# Patient Record
Sex: FEMALE | Race: WHITE | Employment: UNEMPLOYED | URBAN - METROPOLITAN AREA
[De-identification: names, ages, dates, MRNs, and addresses within clinical notes are randomized per-mention and may not be internally consistent; named-entity substitution may affect disease eponyms.]

---

## 2017-06-06 ENCOUNTER — HOSPITAL ENCOUNTER (EMERGENCY)
Age: 32
Discharge: HOME | End: 2017-06-06
Attending: OBSTETRICS & GYNECOLOGY | Admitting: OBSTETRICS & GYNECOLOGY
Payer: MEDICAID

## 2017-06-06 VITALS
DIASTOLIC BLOOD PRESSURE: 78 MMHG | TEMPERATURE: 98.3 F | WEIGHT: 207 LBS | SYSTOLIC BLOOD PRESSURE: 134 MMHG | HEART RATE: 101 BPM | BODY MASS INDEX: 29.63 KG/M2 | HEIGHT: 70 IN | RESPIRATION RATE: 16 BRPM

## 2017-06-06 LAB
BASOPHILS # BLD AUTO: 0 K/UL (ref 0–0.06)
BASOPHILS # BLD: 0 % (ref 0–2)
DIFFERENTIAL METHOD BLD: ABNORMAL
EOSINOPHIL # BLD: 0.1 K/UL (ref 0–0.4)
EOSINOPHIL NFR BLD: 1 % (ref 0–5)
ERYTHROCYTE [DISTWIDTH] IN BLOOD BY AUTOMATED COUNT: 13.2 % (ref 11.6–14.5)
HCT VFR BLD AUTO: 37.4 % (ref 35–45)
HGB BLD-MCNC: 12.8 G/DL (ref 12–16)
LYMPHOCYTES # BLD AUTO: 13 % (ref 21–52)
LYMPHOCYTES # BLD: 1.9 K/UL (ref 0.9–3.6)
MCH RBC QN AUTO: 28.3 PG (ref 24–34)
MCHC RBC AUTO-ENTMCNC: 34.2 G/DL (ref 31–37)
MCV RBC AUTO: 82.7 FL (ref 74–97)
MONOCYTES # BLD: 1.1 K/UL (ref 0.05–1.2)
MONOCYTES NFR BLD AUTO: 8 % (ref 3–10)
NEUTS SEG # BLD: 12 K/UL (ref 1.8–8)
NEUTS SEG NFR BLD AUTO: 78 % (ref 40–73)
PLATELET # BLD AUTO: 285 K/UL (ref 135–420)
PMV BLD AUTO: 10.4 FL (ref 9.2–11.8)
RBC # BLD AUTO: 4.52 M/UL (ref 4.2–5.3)
WBC # BLD AUTO: 15.1 K/UL (ref 4.6–13.2)

## 2017-06-06 PROCEDURE — 76815 OB US LIMITED FETUS(S): CPT

## 2017-06-06 PROCEDURE — 99283 EMERGENCY DEPT VISIT LOW MDM: CPT

## 2017-06-06 PROCEDURE — 85025 COMPLETE CBC W/AUTO DIFF WBC: CPT | Performed by: OBSTETRICS & GYNECOLOGY

## 2017-06-06 PROCEDURE — 87086 URINE CULTURE/COLONY COUNT: CPT | Performed by: OBSTETRICS & GYNECOLOGY

## 2017-06-06 PROCEDURE — 74011250637 HC RX REV CODE- 250/637: Performed by: OBSTETRICS & GYNECOLOGY

## 2017-06-06 PROCEDURE — 36415 COLL VENOUS BLD VENIPUNCTURE: CPT | Performed by: OBSTETRICS & GYNECOLOGY

## 2017-06-06 RX ORDER — ALBUTEROL SULFATE 90 UG/1
2 AEROSOL, METERED RESPIRATORY (INHALATION)
COMMUNITY

## 2017-06-06 RX ORDER — FLUTICASONE PROPIONATE AND SALMETEROL 100; 50 UG/1; UG/1
1 POWDER RESPIRATORY (INHALATION) EVERY 12 HOURS
COMMUNITY

## 2017-06-06 RX ORDER — ACETAMINOPHEN 325 MG/1
650 TABLET ORAL ONCE
Status: COMPLETED | OUTPATIENT
Start: 2017-06-06 | End: 2017-06-06

## 2017-06-06 RX ORDER — MELATONIN
1000 DAILY
COMMUNITY

## 2017-06-06 RX ADMIN — ACETAMINOPHEN 650 MG: 325 TABLET ORAL at 18:52

## 2017-06-06 NOTE — IP AVS SNAPSHOT
Jordyn Mercedes 
 
 
 [address] [facility] [address] 
[phone] Patient: Janie Louise MRN: [id number] :1985 You are allergic to the following No active allergies Recent Documentation Height Weight BMI OB Status Smoking Status 1.778 m 93.9 kg 29.7 kg/m2 Pregnant Current Every Day Smoker Unresulted Labs Order Current Status CULTURE, URINE In process Emergency Contacts Name Discharge Info Relation Home Work Mobile Reynoso Lotus CAREGIVER [3] Spouse [3]   [phone] About your hospitalization You were admitted on:  N/A You last received care in the:  [facility] [address] You were discharged on:  2017 Unit phone number:  [phone] Why you were hospitalized Your primary diagnosis was:  Not on File Providers Seen During Your Hospitalizations Provider Role Specialty Primary office phone Carlos Hummel MD Attending Provider Obstetrics & Gynecology [phone] Your Primary Care Physician (PCP) Primary Care Physician Office Phone Office Fax NOT ON FILE ** None ** ** None ** Follow-up Information Follow up With Details Comments Contact Info Not On File Bshsi   Not On File (62) Patient has a PCP but that physician is not listed in [facility]. Current Discharge Medication List  
  
ASK your doctor about these medications Dose & Instructions Dispensing Information Comments Morning Noon Evening Bedtime ADVAIR DISKUS 100-50 mcg/dose diskus inhaler Generic drug:  fluticasone-salmeterol Your last dose was: Your next dose is:    
   
   
 Dose:  1 Puff Take 1 Puff by inhalation every twelve (12) hours. Refills:  0  
     
   
   
   
  
 albuterol 90 mcg/actuation inhaler Commonly known as:  PROVENTIL HFA, VENTOLIN HFA, PROAIR HFA Your last dose was: Your next dose is:    
   
   
 Dose:  2 Puff Take 2 Puffs by inhalation every four (4) hours as needed for Wheezing. Refills:  0 PNV-DHA PO Your last dose was: Your next dose is: Take  by mouth. Refills:  0  
     
   
   
   
  
 VITAMIN D3 1,000 unit tablet Generic drug:  cholecalciferol Your last dose was: Your next dose is:    
   
   
 Dose:  1000 Units Take 1,000 Units by mouth daily. Refills:  0 Discharge Instructions The office will contact you regarding follow-up appointments with [facility]/MFM. Please monitor for signs of infection- chills, aches, increased temperature (fever > 100.4F), increased abnormal discharge. Please call provider with any further questions. Discharge Orders None Introducing [facility] SERVICES! Tanis Epley introduces Imagiin. patient portal. Now you can access parts of your medical record, email your doctor's office, and request medication refills online. 1. In your internet browser, go to [email or web address] [email or web address] 2. Click on the First Time User? Click Here link in the Sign In box. You will see the New Member Sign Up page. 3. Enter your Imagiin. Access Code exactly as it appears below. You will not need to use this code after youve completed the sign-up process. If you do not sign up before the expiration date, you must request a new code. · Imagiin. Access Code: [email or web address] Expires: 9/4/2017  3:18 PM 
 
4. Enter the last four digits of your Social Security Number (xxxx) and Date of Birth (mm/dd/yyyy) as indicated and click Submit. You will be taken to the next sign-up page. 5. Create a C[employer]ras [employer] ID. This will be your Imagiin. login ID and cannot be changed, so think of one that is secure and easy to remember. 6. Create a [employer] password. You can change your password at any time. 7. Enter your Password Reset Question and Answer. This can be used at a later time if you forget your password. 8. Enter your e-mail address. You will receive e-mail notification when new information is available in [address] 9. Click Sign Up. You can now view and download portions of your medical record. 10. Click the Download Summary menu link to download a portable copy of your medical information. If you have questions, please visit the Frequently Asked Questions section of the [employer]Battle website. Remember, YaBattle is NOT to be used for urgent needs. For medical emergencies, dial 911. Now available from your iPhone and Android! General Information Please provide this summary of care documentation to your next provider. Patient Signature:  ____________________________________________________________ Date:  ____________________________________________________________  
  
Daniella Gardner Provider Signature:  ____________________________________________________________ Date:  ____________________________________________________________

## 2017-06-06 NOTE — IP AVS SNAPSHOT
Summary of Care Report The Summary of Care report has been created to help improve care coordination. Users with access to [employer] or [address] (Web-based application) may access additional patient information including the Discharge Summary. If you are not currently a [address] user and need more information, please call the number listed below in the [place] [address] section and ask to be connected with Medical Records. Facility Information Name Address Phone [address] [address] [phone] Patient Information Patient Name Sex  Autumn Donato ([id number]) Female 1985 Discharge Information Admitting Provider Service Area Unit Chung Cordova MD / [address] [place] [address] L&D / [phone] Discharge Provider Discharge Date/Time Discharge Disposition Destination (none) 2017 (Pending) AHR (none) Patient Language Language ENGLISH [13] You are allergic to the following No active allergies Current Discharge Medication List  
  
ASK your doctor about these medications Dose & Instructions Dispensing Information Comments ADVAIR DISKUS 100-50 mcg/dose diskus inhaler Generic drug:  fluticasone-salmeterol Dose:  1 Puff Take 1 Puff by inhalation every twelve (12) hours. Refills:  0  
   
 albuterol 90 mcg/actuation inhaler Commonly known as:  PROVENTIL HFA, VENTOLIN HFA, PROAIR HFA Dose:  2 Puff Take 2 Puffs by inhalation every four (4) hours as needed for Wheezing. Refills:  0 PNV-DHA PO Take  by mouth. Refills:  0  
   
 VITAMIN D3 1,000 unit tablet Generic drug:  cholecalciferol Dose:  1000 Units Take 1,000 Units by mouth daily. Refills:  0 Follow-up Information Follow up With Details Comments Contact Info Not On File Bsi   Not On File (62) Patient has a PCP but that physician is not listed in [address] [facility]. Discharge Instructions The office will contact you regarding follow-up appointments with [facility]/MFM. Please monitor for signs of infection- chills, aches, increased temperature (fever > 100.4F), increased abnormal discharge. Please call provider with any further questions. Chart Review Routing History No Routing History on File

## 2017-06-06 NOTE — H&P
History & Physical    Name: Araceli Lacy MRN: [id number]  SSN: [id number]    YOB: 1985  Age: 36 y.o. Sex: female      Subjective:     Reason for Admission:  Pregnancy and PPROM    History of Present Illness: Ms. Ludmila Mason is a 36 y.o.  female with an estimated gestational age of 16w7d with Estimated Date of Delivery: 11/15/17. Patient complains of rupture of membranes for 1 days. Pregnancy has been complicated by  premature rupture of membranes. Patient denies abdominal pain  , fever and pelvic pressure. OB History    Para Term  AB SAB TAB Ectopic Multiple Living   2    1 1          # Outcome Date GA Lbr [place]/2nd Weight Sex Delivery Anes PTL Lv   2 Current            1 SAB                 Past Medical History:   Diagnosis Date    Depression      History reviewed. No pertinent surgical history. Social History     Occupational History    Not on file. Social History Main Topics    Smoking status: Current Every Day Smoker    Smokeless tobacco: Not on file    Alcohol use No    Drug use: No    Sexual activity: Not on file      History reviewed. No pertinent family history. No Known Allergies  Prior to Admission medications    Medication Sig Start Date End Date Taking? Authorizing Provider   fluticasone-salmeterol (ADVAIR DISKUS) 100-50 mcg/dose diskus inhaler Take 1 Puff by inhalation every twelve (12) hours. Yes Historical Provider   PNV COMBO#47/IRON/FA #1/DHA (PNV-DHA PO) Take  by mouth. Yes Historical Provider   cholecalciferol (VITAMIN D3) 1,000 unit tablet Take 1,000 Units by mouth daily. Yes Historical Provider   albuterol (PROVENTIL HFA, VENTOLIN HFA, PROAIR HFA) 90 mcg/actuation inhaler Take 2 Puffs by inhalation every four (4) hours as needed for Wheezing. Historical Provider        Review of Systems:  A comprehensive review of systems was negative except for that written in the History of Present Illness.      Objective:     Vitals: Vitals:    17 1518 17 1525   Pulse:  78   Resp:  16   Temp:  98.3 °F (36.8 °C)   Weight: 93.9 kg (207 lb)    Height: 5' 10\" (1.778 m)       Temp (24hrs), Av.3 °F (36.8 °C), Min:98.3 °F (36.8 °C), Max:98.3 °F (36.8 °C)    No Data Recorded     Physical Exam:  Patient without distress. Back: costovertebral angle tenderness absent  Abdomen: soft, nontender  Fundus: soft and non tender  Perineum: blood present, amniotic fluid present  Cervical Exam: 1 (per Dr. Lezama.)/ / /   Lower Extremities:  - No evidence of DVT seen on physical exam.     Membranes:  Previable PPROM  Uterine Activity:  None  Fetal Heart Rate:  present     Sterile speculum exam - no fetal parts, membranes present at 1 cm dilated os  US - absent fluid, breech presentation, Fetal heart activity noted    Lab/Data Review:  No results found for this or any previous visit (from the past 24 hour(s)). Assessment and Plan: Active Problems:    * No active hospital problems. *     - Previable  Premature Rupture of the Membranes:     Discussed options for management. Reviewed risks of infection, sepsis, fetal death, skeletal abnormalities, and fair intact survival rate. Patient and  want to proceed with conservative management. Explained the signs and symptoms of infection. Will discuss with RAI QUINTANILLA.    Signed By:  Chuckie Rodríguez MD     2017

## 2017-06-06 NOTE — PROGRESS NOTES
1650 Abdominal ultrasound by Dr. Elsa Goldberg. No fluid noted upon ultrasound.     1657 SSE by Dr. Elsa Goldberg

## 2017-06-06 NOTE — PROGRESS NOTES
Reviewed plan of care with Dr. Ze Tristan at [facility]. Will discharge home with referral to [facility]. Discussed with patient plan of care. Reviewed risk of infection with patient and family. Discussed possible delivery signs and symptoms. Reviewed risk of retained placenta and the need to return to the hospital if infection starts or labor starts.

## 2017-06-06 NOTE — DISCHARGE INSTRUCTIONS
The office will contact you regarding follow-up appointments with [facility]/MFM. Please monitor for signs of infection- chills, aches, increased temperature (fever > 100.4F), increased abnormal discharge. Please call provider with any further questions.

## 2017-06-06 NOTE — PROGRESS NOTES
1515 Patient arrives to unit after being seen in office today by Yumiko Torres. Patient had back pain starting last night with increased yellowish discharge today. Nitrazine positive in the office with potential trailing membranes. [address] at bedside. Patient informed that Dr. Belenda Bloch will come in to evaluate her this evening. Plan is to US & perform speculum exam. Patient would like to wait until evaluation before knowing further details about potential plans of care. 1540 patient resting with family at bedside for support, questions answered. Call bell within reach, denies further needs at this time. 1650 confirmed heart tones via US performed by Dr. Belenda Bloch. 4376 Dr. Belenda Bloch states discharge precautions have been reviewed. The office will contact her for follow up with [facility]. 6038 Discharge orders discussed with patient and family. Patient verbalizes understanding to call provider with any concerns, signs of infection, vaginal pressure, abnormal discharge, vaginal bleeding. Patient denies questions at this time. 6688 patient is taken off unit by family via wheelchair.

## 2017-06-08 ENCOUNTER — HOSPITAL ENCOUNTER (EMERGENCY)
Age: 32
Discharge: HOME | End: 2017-06-08
Attending: OBSTETRICS & GYNECOLOGY | Admitting: OBSTETRICS & GYNECOLOGY
Payer: MEDICAID

## 2017-06-08 VITALS
SYSTOLIC BLOOD PRESSURE: 124 MMHG | BODY MASS INDEX: 29.63 KG/M2 | WEIGHT: 207 LBS | HEIGHT: 70 IN | HEART RATE: 84 BPM | TEMPERATURE: 98.1 F | DIASTOLIC BLOOD PRESSURE: 58 MMHG

## 2017-06-08 PROBLEM — O42.919 PRETERM PREMATURE RUPTURE OF MEMBRANES (PPROM) WITH UNKNOWN ONSET OF LABOR: Status: ACTIVE | Noted: 2017-06-06

## 2017-06-08 LAB
APPEARANCE UR: ABNORMAL
BACTERIA SPEC CULT: NORMAL
BILIRUB UR QL: NEGATIVE
COLOR UR: YELLOW
GLUCOSE UR QL STRIP.AUTO: NEGATIVE MG/DL
KETONES UR-MCNC: NEGATIVE MG/DL
LEUKOCYTE ESTERASE UR QL STRIP: ABNORMAL
NITRITE UR QL: NEGATIVE
PH UR: 7 [PH] (ref 5–9)
PROT UR QL: ABNORMAL MG/DL
RBC # UR STRIP: ABNORMAL /UL
SERVICE CMNT-IMP: ABNORMAL
SERVICE CMNT-IMP: NORMAL
SP GR UR: 1.01 (ref 1–1.02)
UROBILINOGEN UR QL: 1 EU/DL (ref 0.2–1)

## 2017-06-08 PROCEDURE — 99284 EMERGENCY DEPT VISIT MOD MDM: CPT

## 2017-06-08 PROCEDURE — 76815 OB US LIMITED FETUS(S): CPT

## 2017-06-08 PROCEDURE — 81003 URINALYSIS AUTO W/O SCOPE: CPT

## 2017-06-08 NOTE — IP AVS SNAPSHOT
Summary of Care Report The Summary of Care report has been created to help improve care coordination. Users with access to [employer] or [facility] (Web-based application) may access additional patient information including the Discharge Summary. If you are not currently a [address] user and need more information, please call the number listed below in the [place] [address] section and ask to be connected with Medical Records. Facility Information Name Address Phone [address] [address] [phone] Patient Information Patient Name Sex  Rachelle Hall ([id number]) Female 1985 Discharge Information Admitting Provider Service Area Unit Eleni Larios MD / [phone] [facility] [address] Triage / [phone] Discharge Provider Discharge Date/Time Discharge Disposition Destination (none) (none) (none) (none) Patient Language Language ENGLISH [13] You are allergic to the following No active allergies Current Discharge Medication List  
  
ASK your doctor about these medications Dose & Instructions Dispensing Information Comments ADVAIR DISKUS 100-50 mcg/dose diskus inhaler Generic drug:  fluticasone-salmeterol Dose:  1 Puff Take 1 Puff by inhalation every twelve (12) hours. Refills:  0  
   
 albuterol 90 mcg/actuation inhaler Commonly known as:  PROVENTIL HFA, VENTOLIN HFA, PROAIR HFA Dose:  2 Puff Take 2 Puffs by inhalation every four (4) hours as needed for Wheezing. Refills:  0 PNV-DHA PO Take  by mouth. Refills:  0  
   
 VITAMIN D3 1,000 unit tablet Generic drug:  cholecalciferol Dose:  1000 Units Take 1,000 Units by mouth daily. Refills:  0 Follow-up Information None Discharge Instructions None Chart Review Routing History No Routing History on File

## 2017-06-08 NOTE — IP AVS SNAPSHOT
Current Discharge Medication List  
  
ASK your doctor about these medications Dose & Instructions Dispensing Information Comments Morning Noon Evening Bedtime ADVAIR DISKUS 100-50 mcg/dose diskus inhaler Generic drug:  fluticasone-salmeterol Your last dose was: Your next dose is:    
   
   
 Dose:  1 Puff Take 1 Puff by inhalation every twelve (12) hours. Refills:  0  
     
   
   
   
  
 albuterol 90 mcg/actuation inhaler Commonly known as:  PROVENTIL HFA, VENTOLIN HFA, PROAIR HFA Your last dose was: Your next dose is:    
   
   
 Dose:  2 Puff Take 2 Puffs by inhalation every four (4) hours as needed for Wheezing. Refills:  0 PNV-DHA PO Your last dose was: Your next dose is: Take  by mouth. Refills:  0  
     
   
   
   
  
 VITAMIN D3 1,000 unit tablet Generic drug:  cholecalciferol Your last dose was: Your next dose is:    
   
   
 Dose:  1000 Units Take 1,000 Units by mouth daily. Refills:  0

## 2017-06-08 NOTE — IP AVS SNAPSHOT
[address] [place] [id number] 
[phone] Patient: Leno Kerr MRN: [id number] :1985 You are allergic to the following No active allergies Recent Documentation Height Weight Breastfeeding? BMI OB Status Smoking Status 1.778 m 93.9 kg Unknown 29.7 kg/m2 Pregnant Current Every Day Smoker Emergency Contacts Name Discharge Info Relation Home Work Mobile Edgardo Gautam CAREGIVER [3] Spouse [3]   [phone] About your hospitalization You were admitted on:  N/A You last received care in the:  [facility] [address] L&D TRIAGE You were discharged on:  2017 Unit phone number:  [phone] Why you were hospitalized Your primary diagnosis was:  Not on File Providers Seen During Your Hospitalizations Provider Role Specialty Primary office phone Tiffany Monahan MD Attending Provider Obstetrics & Gynecology [phone] Your Primary Care Physician (PCP) Primary Care Physician Office Phone Office Fax OTHER, PHYS ** None ** ** None ** Follow-up Information None Current Discharge Medication List  
  
ASK your doctor about these medications Dose & Instructions Dispensing Information Comments Morning Noon Evening Bedtime ADVAIR DISKUS 100-50 mcg/dose diskus inhaler Generic drug:  fluticasone-salmeterol Your last dose was: Your next dose is:    
   
   
 Dose:  1 Puff Take 1 Puff by inhalation every twelve (12) hours. Refills:  0  
     
   
   
   
  
 albuterol 90 mcg/actuation inhaler Commonly known as:  PROVENTIL HFA, VENTOLIN HFA, PROAIR HFA Your last dose was: Your next dose is:    
   
   
 Dose:  2 Puff Take 2 Puffs by inhalation every four (4) hours as needed for Wheezing. Refills:  0 PNV-DHA PO Your last dose was: Your next dose is: Take  by mouth. Refills:  0  
     
   
   
   
  
 VITAMIN D3 1,000 unit tablet Generic drug:  cholecalciferol Your last dose was: Your next dose is:    
   
   
 Dose:  1000 Units Take 1,000 Units by mouth daily. Refills:  0 Discharge Instructions None Discharge Instructions Attachments/References PREGNANCY: WEEKS 14 TO 18 (ENGLISH) Discharge Orders None Introducing [facility]! Ana Luisa Anayeli introduces '[employer] Your [employer] patient portal. Now you can access parts of your medical record, email your doctor's office, and request medication refills online. 1. In your internet browser, go to [email or web address]. [email or web address] 2. Click on the First Time User? Click Here link in the Sign In box. You will see the New Member Sign Up page. 3. Enter your 'Rock[employer] Your [employer] Access Code exactly as it appears below. You will not need to use this code after youve completed the sign-up process. If you do not sign up before the expiration date, you must request a new code. · '[employer] Your [employer] Access Code: [email or web address] Expires: 9/4/2017  3:18 PM 
 
4. Enter the last four digits of your Social Security Number (xxxx) and Date of Birth (mm/dd/yyyy) as indicated and click Submit. You will be taken to the next sign-up page. 5. Create a 'Rock' Your [employer] ID. This will be your '[employer] Your [employer] login ID and cannot be changed, so think of one that is secure and easy to remember. 6. Create a '[employer] password. You can change your password at any time. 7. Enter your Password Reset Question and Answer. This can be used at a later time if you forget your password. 8. Enter your e-mail address. You will receive e-mail notification when new information is available in [address] 9. Click Sign Up. You can now view and download portions of your medical record. 10. Click the Download Summary menu link to download a portable copy of your medical information. If you have questions, please visit the Frequently Asked Questions section of the MyChart website. Remember, [employer] is NOT to be used for urgent needs. For medical emergencies, dial 911. Now available from your iPhone and Android! General Information Please provide this summary of care documentation to your next provider. Patient Signature:  ____________________________________________________________ Date:  ____________________________________________________________  
  
Azalia Rios Provider Signature:  ____________________________________________________________ Date:  ____________________________________________________________ More Information Weeks 14 to 18 of Your Pregnancy: Care Instructions Your Care Instructions During this time, you may start to \"show,\" so that you look pregnant to people around you. You may also notice some changes in your skin, such as itchy spots on your palms or acne on your face. Your baby is now able to pass urine, and your baby's first stool (meconium) is starting to collect in his or her intestines. Hair is also beginning to grow on your baby's head. At your next visit, between weeks 18 and 20, your doctor may do an ultrasound test. The test allows your doctor to check for certain problems. Your doctor can also tell the sex of your baby. This is a good time to think about whether you want to know whether your baby is a boy or a girl. Talk to your doctor about getting a flu shot to help keep you healthy during your pregnancy. As your pregnancy moves along, it is common to worry or feel anxious. Your body is changing a lot. And you are thinking about giving birth, the health of your baby, and becoming a parent. You can learn to cope with any anxiety and stress you feel. Follow-up care is a key part of your treatment and safety.  Be sure to make and go to all appointments, and call your doctor if you are having problems. It's also a good idea to know your test results and keep a list of the medicines you take. How can you care for yourself at home? Reduce stress · Ask for help with cooking and housekeeping. · Figure out who or what causes your stress. Avoid these people or situations as much as possible. · Relax every day. Taking 10- to 15-minute breaks can make a big difference. Take a walk, listen to music, or take a warm bath. · Learn relaxation techniques at prenatal or yoga class. Or buy a relaxation tape. · List your fears about having a baby and becoming a parent. Share the list with someone you trust. Decide which worries are really small, and try to let them go. Exercise · If you did not exercise much before pregnancy, start slowly. Walking is best. Adelina Glimpse yourself, and do a little more every day. · Brisk walking, easy jogging, low-impact aerobics, water aerobics, and yoga are good choices. Some sports, such as scuba diving, horseback riding, downhill skiing, gymnastics, and water skiing, are not a good idea. · Try to do at least 2½ hours a week of moderate exercise, such as a fast walk. One way to do this is to be active 30 minutes a day, at least 5 days a week. It's fine to be active in blocks of 10 minutes or more throughout your day and week. · Wear loose clothing. And wear shoes and a bra that provide good support. · Warm up and cool down to start and finish your exercise. · If you want to use weights, be sure to use light weights. They reduce stress on your joints. Stay at the best weight for you · Experts recommend that you gain about 1 pound a month during the first 3 months of your pregnancy. · Experts recommend that you gain about 1 pound a week during your last 6 months of pregnancy, for a total weight gain of 25 to 35 pounds. · If you are underweight, you will need to gain more weight (about 28 to 40 pounds). · If you are overweight, you may not need to gain as much weight (about 15 to 25 pounds). · If you are gaining weight too fast, use common sense. Exercise every day, and limit sweets, fast foods, and fats. Choose lean meats, fruits, and vegetables. · If you are having twins or more, your doctor may refer you to a dietitian. Where can you learn more? Go to http://jl-chemo.info/. Enter C159 in the search box to learn more about \"Weeks 14 to 18 of Your Pregnancy: Care Instructions. \" Current as of: May 30, 2016 Content Version: 11.2 © 7379-5693 [employer], [employer]. Care instructions adapted under license by [employer] (which disclaims liability or warranty for this information). If you have questions about a medical condition or this instruction, always ask your healthcare professional. [place] [address] any warranty or liability for your use of this information.

## 2017-06-09 ENCOUNTER — HOSPITAL ENCOUNTER (EMERGENCY)
Age: 32
Discharge: HOME | End: 2017-06-09
Attending: EMERGENCY MEDICINE | Admitting: INTERNAL MEDICINE
Payer: MEDICAID

## 2017-06-09 VITALS
WEIGHT: 210 LBS | HEIGHT: 71 IN | RESPIRATION RATE: 25 BRPM | DIASTOLIC BLOOD PRESSURE: 51 MMHG | SYSTOLIC BLOOD PRESSURE: 111 MMHG | TEMPERATURE: 98.8 F | BODY MASS INDEX: 29.4 KG/M2 | OXYGEN SATURATION: 99 % | HEART RATE: 88 BPM

## 2017-06-09 DIAGNOSIS — O36.4XX0: ICD-10-CM

## 2017-06-09 DIAGNOSIS — O42.112 PRETERM PREMATURE RUPTURE OF MEMBRANES WITH ONSET OF LABOR MORE THAN 24 HOURS FOLLOWING RUPTURE IN SECOND TRIMESTER: Primary | ICD-10-CM

## 2017-06-09 LAB
ALBUMIN SERPL BCP-MCNC: 2.7 G/DL (ref 3.4–5)
ALBUMIN/GLOB SERPL: 0.6 {RATIO} (ref 0.8–1.7)
ALP SERPL-CCNC: 70 U/L (ref 45–117)
ALT SERPL-CCNC: 11 U/L (ref 13–56)
ANION GAP BLD CALC-SCNC: 9 MMOL/L (ref 3–18)
APTT PPP: 27.6 SEC (ref 23–36.4)
AST SERPL W P-5'-P-CCNC: 10 U/L (ref 15–37)
BASOPHILS # BLD AUTO: 0 K/UL (ref 0–0.06)
BASOPHILS # BLD: 0 % (ref 0–2)
BILIRUB SERPL-MCNC: 0.5 MG/DL (ref 0.2–1)
BUN SERPL-MCNC: 5 MG/DL (ref 7–18)
BUN/CREAT SERPL: 8 (ref 12–20)
CALCIUM SERPL-MCNC: 8.5 MG/DL (ref 8.5–10.1)
CHLORIDE SERPL-SCNC: 104 MMOL/L (ref 100–108)
CO2 SERPL-SCNC: 23 MMOL/L (ref 21–32)
CREAT SERPL-MCNC: 0.65 MG/DL (ref 0.6–1.3)
DIFFERENTIAL METHOD BLD: ABNORMAL
EOSINOPHIL # BLD: 0 K/UL (ref 0–0.4)
EOSINOPHIL NFR BLD: 0 % (ref 0–5)
ERYTHROCYTE [DISTWIDTH] IN BLOOD BY AUTOMATED COUNT: 12.7 % (ref 11.6–14.5)
GLOBULIN SER CALC-MCNC: 4.3 G/DL (ref 2–4)
GLUCOSE SERPL-MCNC: 116 MG/DL (ref 74–99)
HCT VFR BLD AUTO: 35.4 % (ref 35–45)
HGB BLD-MCNC: 12 G/DL (ref 12–16)
INR PPP: 1 (ref 0.8–1.2)
LYMPHOCYTES # BLD AUTO: 7 % (ref 21–52)
LYMPHOCYTES # BLD: 1.1 K/UL (ref 0.9–3.6)
MCH RBC QN AUTO: 27.6 PG (ref 24–34)
MCHC RBC AUTO-ENTMCNC: 33.9 G/DL (ref 31–37)
MCV RBC AUTO: 81.6 FL (ref 74–97)
MONOCYTES # BLD: 1.3 K/UL (ref 0.05–1.2)
MONOCYTES NFR BLD AUTO: 8 % (ref 3–10)
NEUTS SEG # BLD: 13.1 K/UL (ref 1.8–8)
NEUTS SEG NFR BLD AUTO: 85 % (ref 40–73)
PLATELET # BLD AUTO: 279 K/UL (ref 135–420)
PMV BLD AUTO: 10.2 FL (ref 9.2–11.8)
POTASSIUM SERPL-SCNC: 3.5 MMOL/L (ref 3.5–5.5)
PROT SERPL-MCNC: 7 G/DL (ref 6.4–8.2)
PROTHROMBIN TIME: 13 SEC (ref 11.5–15.2)
RBC # BLD AUTO: 4.34 M/UL (ref 4.2–5.3)
SODIUM SERPL-SCNC: 136 MMOL/L (ref 136–145)
WBC # BLD AUTO: 15.6 K/UL (ref 4.6–13.2)

## 2017-06-09 PROCEDURE — 74011250636 HC RX REV CODE- 250/636: Performed by: EMERGENCY MEDICINE

## 2017-06-09 PROCEDURE — 96374 THER/PROPH/DIAG INJ IV PUSH: CPT

## 2017-06-09 PROCEDURE — 85025 COMPLETE CBC W/AUTO DIFF WBC: CPT | Performed by: EMERGENCY MEDICINE

## 2017-06-09 PROCEDURE — 74011250637 HC RX REV CODE- 250/637: Performed by: INTERNAL MEDICINE

## 2017-06-09 PROCEDURE — 99285 EMERGENCY DEPT VISIT HI MDM: CPT

## 2017-06-09 PROCEDURE — 80053 COMPREHEN METABOLIC PANEL: CPT | Performed by: EMERGENCY MEDICINE

## 2017-06-09 PROCEDURE — 85610 PROTHROMBIN TIME: CPT | Performed by: EMERGENCY MEDICINE

## 2017-06-09 PROCEDURE — 74011250637 HC RX REV CODE- 250/637: Performed by: EMERGENCY MEDICINE

## 2017-06-09 PROCEDURE — 85730 THROMBOPLASTIN TIME PARTIAL: CPT | Performed by: EMERGENCY MEDICINE

## 2017-06-09 PROCEDURE — 96375 TX/PRO/DX INJ NEW DRUG ADDON: CPT

## 2017-06-09 PROCEDURE — 96361 HYDRATE IV INFUSION ADD-ON: CPT

## 2017-06-09 PROCEDURE — 88307 TISSUE EXAM BY PATHOLOGIST: CPT | Performed by: OBSTETRICS & GYNECOLOGY

## 2017-06-09 RX ORDER — HYDROCODONE BITARTRATE AND ACETAMINOPHEN 5; 325 MG/1; MG/1
TABLET ORAL
Qty: 12 TAB | Refills: 0 | Status: SHIPPED | OUTPATIENT
Start: 2017-06-09 | End: 2019-05-23

## 2017-06-09 RX ORDER — MISOPROSTOL 200 UG/1
800 TABLET ORAL ONCE
Qty: 4 TAB | Refills: 0 | Status: SHIPPED | OUTPATIENT
Start: 2017-06-09 | End: 2017-06-09

## 2017-06-09 RX ORDER — ONDANSETRON 2 MG/ML
4 INJECTION INTRAMUSCULAR; INTRAVENOUS
Status: COMPLETED | OUTPATIENT
Start: 2017-06-09 | End: 2017-06-09

## 2017-06-09 RX ORDER — HYDROMORPHONE HYDROCHLORIDE 1 MG/ML
1 INJECTION, SOLUTION INTRAMUSCULAR; INTRAVENOUS; SUBCUTANEOUS ONCE
Status: COMPLETED | OUTPATIENT
Start: 2017-06-09 | End: 2017-06-09

## 2017-06-09 RX ORDER — MISOPROSTOL 100 UG/1
800 TABLET ORAL ONCE
Status: COMPLETED | OUTPATIENT
Start: 2017-06-09 | End: 2017-06-09

## 2017-06-09 RX ORDER — IBUPROFEN 600 MG/1
600 TABLET ORAL
Status: COMPLETED | OUTPATIENT
Start: 2017-06-09 | End: 2017-06-09

## 2017-06-09 RX ORDER — IBUPROFEN 600 MG/1
600 TABLET ORAL
Qty: 20 TAB | Refills: 0 | Status: SHIPPED | OUTPATIENT
Start: 2017-06-09 | End: 2019-05-23

## 2017-06-09 RX ADMIN — ONDANSETRON 4 MG: 2 INJECTION INTRAMUSCULAR; INTRAVENOUS at 03:07

## 2017-06-09 RX ADMIN — HYDROMORPHONE HYDROCHLORIDE 1 MG: 1 INJECTION, SOLUTION INTRAMUSCULAR; INTRAVENOUS; SUBCUTANEOUS at 03:08

## 2017-06-09 RX ADMIN — SODIUM CHLORIDE 1000 ML: 900 INJECTION, SOLUTION INTRAVENOUS at 04:30

## 2017-06-09 RX ADMIN — MISOPROSTOL 800 MCG: 100 TABLET ORAL at 07:42

## 2017-06-09 RX ADMIN — IBUPROFEN 600 MG: 600 TABLET, FILM COATED ORAL at 11:23

## 2017-06-09 RX ADMIN — OXYTOCIN: 10 INJECTION, SOLUTION INTRAMUSCULAR; INTRAVENOUS at 06:46

## 2017-06-09 NOTE — ED NOTES
Vaginal bleeding stable, very minimal, linens and absorbent pad changed to better monitor status. VSS. Call bell within reach.

## 2017-06-09 NOTE — PROGRESS NOTES
conducted an initial consultation and Spiritual Assessment for Tasha Lockwood, who is a 36 y.o.,female. Patients Primary Language is: [place]. According to the patients EMR Yarsani Affiliation is: No preference. The reason the Patient came to the hospital is:   Patient Active Problem List    Diagnosis Date Noted     premature rupture of membranes (PPROM) with unknown onset of labor 2017        The  provided the following Interventions:  Initiated a relationship of care and support. Explored issues of diana, belief, spirituality and Methodist/ritual needs while hospitalized. Listened empathically. Provided chaplaincy education. Provided information about Spiritual Care Services. Offered prayer and assurance of continued prayers on patient's behalf. Chart reviewed. Adventist    The following outcomes were achieved:  Patient shared limited information about both their medical narrative and spiritual journey/beliefs. Patient processed feeling about current hospitalization. Patient expressed gratitude for the 's visit. Assessment:  Patient does not have any Methodist/cultural needs that will affect patients preferences in health care. Patient did not indicate any spiritual or Methodist issues which require Spiritual Care Services interventions at this time. Plan:  Chaplains will continue to follow and will provide pastoral care on an as needed/requested basis.  recommends bedside caregivers page  on duty if patient shows signs of acute spiritual or emotional distress.     [address], [address] [place] [address], [facility]

## 2017-06-09 NOTE — ED TRIAGE NOTES
Patient arrives via EMS for miscarriage. Pt states she was having pain Tuesday seen by OB and then sent home. Patient delivered stillborn en route to facility. Patient has not passed placenta and actively passing clots. Sepsis Screening completed    (  )Patient meets SIRS criteria. ( x )Patient does not meet SIRS criteria.       SIRS Criteria is achieved when two or more of the following are present   Temperature < 96.8°F (36°C) or > 100.9°F (38.3°C)   Heart Rate > 90 beats per minute   Respiratory Rate > 20 breaths per minute   WBC count > 12,000 or <4,000 or > 10% bands

## 2017-06-09 NOTE — DISCHARGE INSTRUCTIONS
Incomplete Miscarriage: Care Instructions  Your Care Instructions  A miscarriage is the loss of a pregnancy during the first 20 weeks. Miscarriages are very common. Most happen because the fertilized egg in the uterus does not develop normally. Stress, exercise, or sex does not cause a miscarriage. While many miscarriages pass on their own, some do not. These are called incomplete miscarriages because all of the tissue related to pregnancy is not shed from the uterus. An incomplete miscarriage often requires treatment. Medicine or a procedure call dilation and curettage (D&C) is used to clear the tissue from the uterus. If your blood type is Rh negative, ask your doctor if you need a shot of Rh immune globulin (RhoGAM) to prevent problems in future pregnancies. Follow-up care is a key part of your treatment and safety. Be sure to make and go to all appointments, and call your doctor if you are having problems. It's also a good idea to know your test results and keep a list of the medicines you take. How can you care for yourself at home? · You will probably have vaginal bleeding for 1 to 2 weeks after treatment. It may be similar to or slightly heavier than a normal period. Use pads instead of tampons. You may use tampons during your next period, which should start in 3 to 6 weeks. · Take an over-the-counter pain medicine, such as acetaminophen (Tylenol), ibuprofen (Advil, Motrin), or naproxen (Aleve), for cramps. You may have cramps for several days after the miscarriage. Read and follow all instructions on the label. · Do not take two or more pain medicines at the same time unless the doctor told you to. Many pain medicines have acetaminophen, which is Tylenol. Too much acetaminophen (Tylenol) can be harmful. · Your doctor may ask you to use a clear container to save any tissue or clots that you pass. Take it to your doctor's office right away. · Do not have sex until the bleeding stops.   · You may return to your normal activities if you feel well enough to do so. But you should avoid heavy exercise until the bleeding stops. · If you plan to get pregnant again, check with your doctor. Most doctors suggest waiting until you have had at least one normal period before you try to get pregnant. · If you do not want to get pregnant, ask your doctor about birth control. You can get pregnant again before your next period starts. · You may be low in iron because of blood loss. Eat a balanced diet that is high in iron and vitamin C. Foods rich in iron include red meat, shellfish, eggs, beans, and leafy green vegetables. Talk to your doctor about whether you need to take iron pills or a multivitamin. · The loss of a pregnancy can be very hard. Give yourself and your partner time to grieve. Even if your miscarriage occurred very early, you may still have feelings of loss. You may wonder why it happened and blame yourself. ¨ Talking to family members, friends, or a counselor may help you cope with your loss. ¨ If your feelings of sadness last longer than 2 weeks, tell your doctor or a counselor. When should you call for help? Call 911 anytime you think you may need emergency care. For example, call if:  · You have sudden, severe pain in your belly. · You passed out (lost consciousness). Call your doctor now or seek immediate medical care if:  · You have severe vaginal bleeding. This means that you are soaking through your usual pads each hour for 2 or more hours. · You are dizzy or lightheaded, or you feel like you may faint. · You have new or increased pain in your belly or pelvis. · You pass tissue, not just blood. · You feel depressed or are not able to function. Watch closely for changes in your health, and be sure to contact your doctor if:  · You are bleeding more than you would with a normal period. · You do not get better as expected. · You have any new symptoms, such as a fever.   · You have vaginal discharge that smells bad. Where can you learn more? Go to http://jl-chemo.info/. Enter L103 in the search box to learn more about \"Incomplete Miscarriage: Care Instructions. \"  Current as of: May 30, 2016  Content Version: 11.2  © 6623-2319 [employer]. Care instructions adapted under license by [employer] (which disclaims liability or warranty for this information). If you have questions about a medical condition or this instruction, always ask your healthcare professional. [address] any warranty or liability for your use of this information.

## 2017-06-09 NOTE — ED PROVIDER NOTES
[address]  EMERGENCY DEPARTMENT HISTORY AND PHYSICAL EXAM       Date: 2017   Patient Name: Hetal Balderrama   YOB: 1985  Medical Record Number: [id number]    History of Presenting Illness     Chief Complaint   Patient presents with    Miscarriage        History Provided By:  patient    Additional History:  2:44 AM   Hetal Balderrama is a 36 y.o.  female presenting to the ED via EMS C/O miscarriage onset tonight. Associated sxs include vaginal bleeding onset 2 days ago after a pelvic exam. Pt reports back pain and pelvic pain x 2 days, for which she was evaluated by her OB/Gyn and informed that her water broke. She was then sent to [facility] and had US confirming PPROM. En route to ED tonight, pt delivered stillborn fetus. She has not passed the placenta. Pt states she bruises easily. PMHx includes asthma for which she takes Advair and Ventolin as needed. She took Advair about 5 hours ago. Pt's OB/Gyn is Dr. Patsey Schirmer [facility]). Pt denies SOB, drug allergies, and any other symptoms or complaints at this time. Primary Care Provider: Kishor Perry MD   Specialist:    Past History     Past Medical History:   Past Medical History:   Diagnosis Date    Asthma     Depression         Past Surgical History:   Past Surgical History:   Procedure Laterality Date    HX OTHER SURGICAL      knee 17years old         Family History:   History reviewed. No pertinent family history. Social History:   Social History   Substance Use Topics    Smoking status: Current Every Day Smoker     Packs/day: 0.50     Years: 10.00    Smokeless tobacco: None    Alcohol use No        Allergies:   No Known Allergies     Review of Systems   Review of Systems   Respiratory: Negative for shortness of breath. Genitourinary: Positive for pelvic pain and vaginal bleeding. Hematological: Bruises/bleeds easily. All other systems reviewed and are negative.       Physical Exam  Vitals:    17 0445 06/09/17 0600 06/09/17 0616 06/09/17 0621   BP: 131/65 118/64 120/59 120/59   Pulse: 94  97 88   Resp: 15  24 20   Temp:    98.8 °F (37.1 °C)   SpO2: 97%  98% 97%   Weight:       Height:           Physical Exam   Constitutional: She is oriented to person, place, and time. She appears well-developed and well-nourished. No distress. Uncomfortable appearing. Tearful and anxious, but appropriate given current medical condition. HENT:   Head: Normocephalic and atraumatic. Right Ear: External ear normal.   Left Ear: External ear normal.   Mouth/Throat: Oropharynx is clear and moist. No oropharyngeal exudate. Eyes: Conjunctivae and EOM are normal. Pupils are equal, round, and reactive to light. No scleral icterus. No pallor   Neck: Normal range of motion. Neck supple. No JVD present. No tracheal deviation present. No thyromegaly present. Cardiovascular: Normal rate, regular rhythm and normal heart sounds. Pulmonary/Chest: Effort normal and breath sounds normal. No stridor. No respiratory distress. Abdominal: Soft. Bowel sounds are normal. She exhibits no distension. There is no tenderness. There is no rebound and no guarding. Musculoskeletal: Normal range of motion. She exhibits no edema or tenderness. No soft tissue injuries   Lymphadenopathy:     She has no cervical adenopathy. Neurological: She is alert and oriented to person, place, and time. She has normal reflexes. No cranial nerve deficit. Coordination normal.   Skin: Skin is warm and dry. No rash noted. She is not diaphoretic. No erythema. Psychiatric: She has a normal mood and affect. Her behavior is normal. Judgment and thought content normal.   Nursing note and vitals reviewed.         Diagnostic Study Results     Labs -      Recent Results (from the past 12 hour(s))   POC URINE MACROSCOPIC    Collection Time: 06/08/17  7:55 PM   Result Value Ref Range    Color YELLOW      Appearance CLOUDY      Spec. gravity (POC) 1.015 1.001 - 1.023      pH, urine  (POC) 7.0 5.0 - 9.0      Protein (POC) TRACE (A) NEG mg/dL    Glucose, urine (POC) NEGATIVE  NEG mg/dL    Ketones (POC) NEGATIVE  NEG mg/dL    Bilirubin (POC) NEGATIVE  NEG      Blood (POC) LARGE (A) NEG      Urobilinogen (POC) 1.0 0.2 - 1.0 EU/dL    Nitrite (POC) NEGATIVE  NEG      Leukocyte esterase (POC) LARGE (A) NEG      Performed by Stuart Varela    CBC WITH AUTOMATED DIFF    Collection Time: 06/09/17  3:15 AM   Result Value Ref Range    WBC 15.6 (H) 4.6 - 13.2 K/uL    RBC 4.34 4.20 - 5.30 M/uL    HGB 12.0 12.0 - 16.0 g/dL    HCT 35.4 35.0 - 45.0 %    MCV 81.6 74.0 - 97.0 FL    MCH 27.6 24.0 - 34.0 PG    MCHC 33.9 31.0 - 37.0 g/dL    RDW 12.7 11.6 - 14.5 %    PLATELET 447 068 - 533 K/uL    MPV 10.2 9.2 - 11.8 FL    NEUTROPHILS 85 (H) 40 - 73 %    LYMPHOCYTES 7 (L) 21 - 52 %    MONOCYTES 8 3 - 10 %    EOSINOPHILS 0 0 - 5 %    BASOPHILS 0 0 - 2 %    ABS. NEUTROPHILS 13.1 (H) 1.8 - 8.0 K/UL    ABS. LYMPHOCYTES 1.1 0.9 - 3.6 K/UL    ABS. MONOCYTES 1.3 (H) 0.05 - 1.2 K/UL    ABS. EOSINOPHILS 0.0 0.0 - 0.4 K/UL    ABS. BASOPHILS 0.0 0.0 - 0.06 K/UL    DF AUTOMATED     METABOLIC PANEL, COMPREHENSIVE    Collection Time: 06/09/17  3:15 AM   Result Value Ref Range    Sodium 136 136 - 145 mmol/L    Potassium 3.5 3.5 - 5.5 mmol/L    Chloride 104 100 - 108 mmol/L    CO2 23 21 - 32 mmol/L    Anion gap 9 3.0 - 18 mmol/L    Glucose 116 (H) 74 - 99 mg/dL    BUN 5 (L) 7.0 - 18 MG/DL    Creatinine 0.65 0.6 - 1.3 MG/DL    BUN/Creatinine ratio 8 (L) 12 - 20      GFR est AA >60 >60 ml/min/1.73m2    GFR est non-AA >60 >60 ml/min/1.73m2    Calcium 8.5 8.5 - 10.1 MG/DL    Bilirubin, total 0.5 0.2 - 1.0 MG/DL    ALT (SGPT) 11 (L) 13 - 56 U/L    AST (SGOT) 10 (L) 15 - 37 U/L    Alk.  phosphatase 70 45 - 117 U/L    Protein, total 7.0 6.4 - 8.2 g/dL    Albumin 2.7 (L) 3.4 - 5.0 g/dL    Globulin 4.3 (H) 2.0 - 4.0 g/dL    A-G Ratio 0.6 (L) 0.8 - 1.7     PROTHROMBIN TIME + INR    Collection Time: 06/09/17  3:15 AM   Result Value Ref Range    Prothrombin time 13.0 11.5 - 15.2 sec    INR 1.0 0.8 - 1.2     PTT    Collection Time: 06/09/17  3:15 AM   Result Value Ref Range    aPTT 27.6 23.0 - 36.4 SEC       Radiologic Studies -  The following have been ordered and reviewed:  No orders to display           Medical Decision Making   I am the first provider for this patient. I reviewed the vital signs, available nursing notes, past medical history, past surgical history, family history and social history. Vital Signs-Reviewed the patient's vital signs. Patient Vitals for the past 12 hrs:   Temp Pulse Resp BP SpO2   06/09/17 0621 98.8 °F (37.1 °C) 88 20 120/59 97 %   06/09/17 0616 - 97 24 120/59 98 %   06/09/17 0600 - - - 118/64 -   06/09/17 0445 - 94 15 131/65 97 %   06/09/17 0430 - 99 22 133/72 98 %   06/09/17 0415 - 90 17 124/71 97 %   06/09/17 0400 - 100 17 115/64 94 %   06/09/17 0345 - 94 18 133/62 96 %   06/09/17 0315 - 88 14 120/57 95 %   06/09/17 0300 - - - - 97 %   06/09/17 0230 - 78 13 97/54 98 %   06/09/17 0207 98.5 °F (36.9 °C) 68 22 (!) 125/97 100 %       Pulse Oximetry Analysis - Normal 100% on room air     Old Medical Records: Old medical records. Nursing notes. Provider Notes:   DDx: PROM with subsequent IUFD. However, she has retained products of conception. Trying to use Pitocin for delivery. Procedures:   Pelvic Exam  Date/Time: 6/9/2017 4:49 AM  Performed by: attending  Procedure duration:  5 minutes. Documented by:  GUILLE Sommer. As dictated by:  Shaista Bui MD  Exam assisted by:  Female RN. Type of exam performed: bimanual and speculum. External genitalia appearance: normal.    Vaginal exam:  bleeding. Bleeding: moderate (with fwe small clots evacuated)  Cervical exam:  normal and active bleeding from os. Specimen(s) collected:  none. Bimanual exam:  uterine enlargement.     Patient tolerance: Patient tolerated the procedure well with no immediate complications            ED Course:  2:44 AM  Initial assessment performed. The patients presenting problems have been discussed, and they are in agreement with the care plan formulated and outlined with them. I have encouraged them to ask questions as they arise throughout their visit. 6:20 AM  OB/Gyn called while we were actively working a code. Awaiting her to call back again. 6:45 AM   Discussed patient's history, exam, and available diagnostics results with Elvira Zavaleta MD, Ob/Gyn, who recommends adding Cytotec 800 mg. Medications Given in the ED:  Medications   miSOPROStol (CYTOTEC) tablet 800 mcg (not administered)   ondansetron (ZOFRAN) injection 4 mg (4 mg IntraVENous Given 17 0307)   HYDROmorphone (PF) (DILAUDID) injection 1 mg (1 mg IntraVENous Given 17 0308)   sodium chloride 0.9 % bolus infusion 1,000 mL (1,000 mL IntraVENous New Bag 17 0430)   oxytocin 10 Units in dextrose 5% 500 mL IVPB ( IntraVENous Given 17 0646)       Discharge Note:  7:09 AM   Pt has been reexamined. Patient has no new complaints, changes, or physical findings. Care plan outlined and precautions discussed. Results were reviewed with the patient. All medications were reviewed with the patient; will d/c home. All of pt's questions and concerns were addressed. Patient was instructed and agrees to follow up with OB/Gyn directly after discharge from ED, as well as to return to the ED upon further deterioration. Patient is ready to go home. Diagnosis   Clinical Impression:   1.  premature rupture of membranes with onset of labor more than 24 hours following rupture in second trimester    2.  Intrauterine fetal death in pregnancy, not applicable or unspecified fetus           Follow-up Information     Follow up With Details Comments 11 [facility] Lisa Mathias MD Go today Immediately following discharge [address] [address] [facility] EMERGENCY DEPT  As needed, If symptoms worsen 2 Omayra Byrne [facility] [address]  [phone]          Current Discharge Medication List      START taking these medications    Details   miSOPROStol (CYTOTEC) 200 mcg tablet Take 4 Tabs by mouth once for 1 dose. Take this dose at 7 PM tonight  Indications:   Qty: 4 Tab, Refills: 0      HYDROcodone-acetaminophen (NORCO) 5-325 mg per tablet Take 1-2 tablets PO every 4-6 hours as needed for pain control. If over the counter ibuprofen or acetaminophen was suggested, then only take the vicodin for pain not well controlled with the over the counter medication. Qty: 12 Tab, Refills: 0      ibuprofen (MOTRIN) 600 mg tablet Take 1 Tab by mouth every six (6) hours as needed for Pain. Qty: 20 Tab, Refills: 0             _______________________________   Attestations: This note is prepared by Arcenio Goss, acting as a Scribe for Isela. Karen Gr MD on 2:29 AM on 2017 . [employer]. Karen Gr MD: The scribe's documentation has been prepared under my direction and personally reviewed by me in its entirety.   _______________________________

## 2017-06-09 NOTE — H&P
History & Physical    Name: Timothy Martinez MRN: [id number]  SSN: [id number]    YOB: 1985  Age: 36 y.o. Sex: female      Subjective:     Estimated Date of Delivery: 11/15/17  OB History    Para Term  AB SAB TAB Ectopic Multiple Living   2    1 1          # Outcome Date GA Lbr [place]/2nd Weight Sex Delivery Anes PTL Lv   2 Current            1 SAB                   Ms. Berg Hush  in triage with pregnancy at 17w1d for increased leaking of fluid, cramping and bleeding. Prenatal course was normal. Quad screening negative per patient. Please see prenatal records for details. HPI: Patient presented to triage on  with leaking of fluid, PPROM confirmed by Dr. Alex Mcintosh via [address]. Heart tones noted at that time. Cervix visually 1cm per Dr. Alex Mcintosh sterile speculum exam. Patient and  desired expectant management at that time. Past Medical History:   Diagnosis Date    Asthma     Depression      Past Surgical History:   Procedure Laterality Date    HX OTHER SURGICAL      knee 17years old      Social History     Occupational History    Not on file. Social History Main Topics    Smoking status: Current Every Day Smoker     Packs/day: 0.50     Years: 10.00    Smokeless tobacco: Not on file    Alcohol use No    Drug use: No    Sexual activity: No     History reviewed. No pertinent family history. No Known Allergies  Prior to Admission medications    Medication Sig Start Date End Date Taking? Authorizing Provider   fluticasone-salmeterol (ADVAIR DISKUS) 100-50 mcg/dose diskus inhaler Take 1 Puff by inhalation every twelve (12) hours. Yes Historical Provider   albuterol (PROVENTIL HFA, VENTOLIN HFA, PROAIR HFA) 90 mcg/actuation inhaler Take 2 Puffs by inhalation every four (4) hours as needed for Wheezing. Yes Historical Provider   PNV COMBO#47/IRON/FA #1/DHA (PNV-DHA PO) Take  by mouth.    Yes Historical Provider   cholecalciferol (VITAMIN D3) 1,000 unit tablet Take 1,000 Units by mouth daily. Yes Historical Provider        Review of Systems: A comprehensive review of systems was negative except for that written in the History of Present Illness. Objective:     Vitals:  Vitals:    17   Temp:  98.1 °F (36.7 °C)   Weight: 93.9 kg (207 lb)    Height: 5' 10\" (1.778 m)         Physical Exam:  Patient without distress. Abdomen: soft, nontender  Fundus: soft and non tender  Perineum: blood absent, amniotic fluid present ( Clear, mucus discharge)  Cervical Exam: Visibly closed by sterile speculum exam  Membranes:   Premature Rupture of Membranes; Amniotic Fluid: clear fluid. Ruptured on   Fetal Heart Rate: 180-190s per ultrasound and doppler  TOCO: No contractions     Prenatal Labs:   No results found for: ABORH, RUBELLAEXT, HBSAGEXT, HIVEXT, RPREXT, GONNOEXT, CHLAMEXT, ABORHEXT, RUBELLAEXT, GRBSEXT, HBSAGEXT, HIVEXT, RPREXT, GONNOEXT, CHLAMEXT    Impression/Plan:     Active Problems:    * No active hospital problems. *       Plan: Patient discussed with Dr. John Shoemaker. Orders to discharge home with expectant management. Patient has appointment with [facility] on Monday. Patient directed to keep track of her temperature every 4 hours at home, if signs and symptoms of infection occur, bright red vaginal bleeding, severe abdominal cramping, or passing of tissue, patient directed to come back to L&D.      Signed By:  Harsha Page CNM     2017

## 2017-06-09 NOTE — ED NOTES
Bedside shift change report given to KAROL WongRN (oncoming nurse) by BERTA Albarran RN (offgoing nurse). Report included the following information SBAR, Kardex, ED Summary, Procedure Summary, MAR, Accordion, Recent Results, Med Rec Status and Alarm Parameters .

## 2017-06-09 NOTE — ROUTINE PROCESS
1935-Pt arrived on unit complains of cramping and bleeding. Pt states she was seen by Dr. Александр Melvin two days ago ion unit. Pt was ruptured and they sent her home. Pt asked to place gown on and placed on monitor only toco. Vitals stable. No visible bleeding on perineum. 2000-This nurse at bedside using doppler for heart tones unable to obtain, CNM on unit and will use US. 2010-Sandee at bedside with student with [address] [facility] found baby heart rate 180-190's. Vaginal spec cervix appears closed and no presenting part in vagina. CNM reviewed with Dr. Richy Michelle. No further orders. 2100-Disharge instructions reviewed, pt getting dressed for discharge. 2111-Pt walked off floor with family. Has appt on Monday with [facility].

## 2017-06-09 NOTE — ED NOTES
Patient given discharge instructions and given time to say goodbye to fetus. Mother with appropriate grieving process noted. Patient discharged via wheelchair and asking to keep armband as a sentimental reason.

## 2017-06-09 NOTE — ED NOTES
visiting in room with patient. Baby remains in mother's arms.  and mother at bedside with her. VSS. Remains pain free and nausea free.  Vaginal bleeding stable, very minimal.

## 2017-06-09 NOTE — ED NOTES
This RN assisted Dr. Rosi Wong with administration of intravaginal cytotec. Patient tolerated procedure well. Patient given jolie care. Fetus remains with mother at this time . Emotional support given to patient and . Discussed with patient that will be discharged at 0800. Patient and  verbalized understanding.

## 2017-06-09 NOTE — ED NOTES
Pt holding baby in her arms wrapped in towel at this time. Inquired if she would like  to come visit and that we would arrange that. VSS. Call acuña within reach. Dr Nancy James in room evaluating patient.

## 2017-06-09 NOTE — ED NOTES
Pt resting comforably in bed. No requests or complaints at this time. VSS. Reports she is just starting to have abdominal cramping and back return but tolerable. She inquires if she can have a few more moments with baby and told her there was no real time line, when she was comfortable we would proceed to send fetus to pathology per protocol. Pt expressed understanding. VSS. Call bell within reach.

## 2017-06-09 NOTE — PROGRESS NOTES
conducted a Follow up consultation and Spiritual Assessment for Myron White, who is a 36 y.o.,female. The  provided the following Interventions:  Continued the relationship of care and support. Listened empathically. Offered prayer and assurance of continued prayer on patients behalf. Chart reviewed. The following outcomes were achieved:  Patient expressed gratitude for pastoral care visit. Assessment:  Patient received Memory Box, with memorabilia. Patient stated they will select [facility] later. There are no further spiritual or Jehovah's witness issues which require Spiritual Care Services interventions at this time. Plan:  Chaplains will continue to follow and will provide pastoral care on an as needed/requested basis.  recommends bedside caregivers page  on duty if patient shows signs of acute spiritual or emotional distress. Rev.  [address]  [phone]

## 2017-06-09 NOTE — ED NOTES
Pt reassurance provided. Hand and foot prints attempted and given to mother. Infant placed in baby hat and baby blanket and given back to mother. Mother also given information about  loss support group. Informed mother that [facility] would be coming to take pictures of family and infant. Mother also given  memento box. Sudheer Hernadez Grief Counselor en route to speak with family.

## 2018-10-15 LAB
CHLAMYDIA, EXTERNAL: NORMAL
HBSAG, EXTERNAL: NORMAL
HIV, EXTERNAL: NORMAL
N. GONORRHEA, EXTERNAL: NORMAL
RPR, EXTERNAL: NORMAL
RUBELLA, EXTERNAL: NORMAL
TYPE, ABO & RH, EXTERNAL: NORMAL

## 2019-04-29 LAB — GRBS, EXTERNAL: NORMAL

## 2019-05-10 ENCOUNTER — HOSPITAL ENCOUNTER (OUTPATIENT)
Age: 34
Discharge: HOME | End: 2019-05-11
Attending: OBSTETRICS & GYNECOLOGY | Admitting: OBSTETRICS & GYNECOLOGY
Payer: MEDICAID

## 2019-05-10 LAB
APPEARANCE UR: NORMAL
BILIRUB UR QL: NEGATIVE
COLOR UR: YELLOW
GLUCOSE UR QL STRIP.AUTO: NEGATIVE MG/DL
KETONES UR-MCNC: NEGATIVE MG/DL
LEUKOCYTE ESTERASE UR QL STRIP: NEGATIVE
NITRITE UR QL: NEGATIVE
PH UR: 6.5 [PH] (ref 5–9)
PROT UR QL: NEGATIVE MG/DL
RBC # UR STRIP: NEGATIVE /UL
SERVICE CMNT-IMP: NORMAL
SP GR UR: 1.02 (ref 1–1.02)
UROBILINOGEN UR QL: 1 EU/DL (ref 0.2–1)

## 2019-05-10 PROCEDURE — 81003 URINALYSIS AUTO W/O SCOPE: CPT

## 2019-05-10 PROCEDURE — 59025 FETAL NON-STRESS TEST: CPT

## 2019-05-11 VITALS
RESPIRATION RATE: 17 BRPM | HEART RATE: 78 BPM | TEMPERATURE: 98.3 F | SYSTOLIC BLOOD PRESSURE: 110 MMHG | HEIGHT: 70 IN | WEIGHT: 232 LBS | DIASTOLIC BLOOD PRESSURE: 42 MMHG | BODY MASS INDEX: 33.21 KG/M2

## 2019-05-11 PROBLEM — Z33.1 IUP (INTRAUTERINE PREGNANCY), INCIDENTAL: Status: ACTIVE | Noted: 2019-05-11

## 2019-05-11 PROCEDURE — 99284 EMERGENCY DEPT VISIT MOD MDM: CPT

## 2019-05-11 NOTE — PROGRESS NOTES
2215  at 38.3 arrives to unit with complaints of back pain and pelvic pressure, indicates she lost her mucus plug yesterday. 2230 SVE /thick/-3 
[email or web address] on unit, at bedside examining patient. 2338 Patient up to walk for an hour and recheck for cervical change for rule out labor. 0100 SVE unchanged. Pt to be discharged. Patient in agreement with plan of care, refuses pain medication at this time, indicates she 'would like to go home and sleep' 
0120 discharge instructions reviewed and discussed with patient, to include pregnancy precautions, no questions or concerns at this time patient to follow up with MD on Wednesday for scheduled appointment. 0127 patient ambulates off unit with significant other.

## 2019-05-20 ENCOUNTER — HOSPITAL ENCOUNTER (INPATIENT)
Age: 34
LOS: 3 days | Discharge: HOME | DRG: 560 | End: 2019-05-23
Attending: OBSTETRICS & GYNECOLOGY | Admitting: OBSTETRICS & GYNECOLOGY
Payer: MEDICAID

## 2019-05-20 PROBLEM — Z34.90 TERM PREGNANCY: Status: ACTIVE | Noted: 2019-05-20

## 2019-05-20 PROCEDURE — 59025 FETAL NON-STRESS TEST: CPT

## 2019-05-20 PROCEDURE — 4A0HXCZ MEASUREMENT OF PRODUCTS OF CONCEPTION, CARDIAC RATE, EXTERNAL APPROACH: ICD-10-PCS | Performed by: OBSTETRICS & GYNECOLOGY

## 2019-05-20 PROCEDURE — 10907ZC DRAINAGE OF AMNIOTIC FLUID, THERAPEUTIC FROM PRODUCTS OF CONCEPTION, VIA NATURAL OR ARTIFICIAL OPENING: ICD-10-PCS | Performed by: OBSTETRICS & GYNECOLOGY

## 2019-05-20 PROCEDURE — 65270000032 HC RM SEMIPRIVATE

## 2019-05-20 PROCEDURE — 99282 EMERGENCY DEPT VISIT SF MDM: CPT

## 2019-05-20 RX ORDER — MINERAL OIL
30 OIL (ML) ORAL AS NEEDED
Status: COMPLETED | OUTPATIENT
Start: 2019-05-20 | End: 2019-05-21

## 2019-05-20 RX ORDER — OXYTOCIN/RINGER'S LACTATE 20/1000 ML
125 PLASTIC BAG, INJECTION (ML) INTRAVENOUS CONTINUOUS
Status: DISCONTINUED | OUTPATIENT
Start: 2019-05-21 | End: 2019-05-21 | Stop reason: HOSPADM

## 2019-05-20 RX ORDER — LIDOCAINE HYDROCHLORIDE 10 MG/ML
20 INJECTION, SOLUTION EPIDURAL; INFILTRATION; INTRACAUDAL; PERINEURAL AS NEEDED
Status: DISCONTINUED | OUTPATIENT
Start: 2019-05-20 | End: 2019-05-21 | Stop reason: HOSPADM

## 2019-05-20 RX ORDER — BUTORPHANOL TARTRATE 2 MG/ML
2 INJECTION INTRAMUSCULAR; INTRAVENOUS
Status: COMPLETED | OUTPATIENT
Start: 2019-05-20 | End: 2019-05-21

## 2019-05-20 RX ORDER — SODIUM CHLORIDE, SODIUM LACTATE, POTASSIUM CHLORIDE, CALCIUM CHLORIDE 600; 310; 30; 20 MG/100ML; MG/100ML; MG/100ML; MG/100ML
125 INJECTION, SOLUTION INTRAVENOUS CONTINUOUS
Status: DISCONTINUED | OUTPATIENT
Start: 2019-05-21 | End: 2019-05-21 | Stop reason: HOSPADM

## 2019-05-20 RX ORDER — TERBUTALINE SULFATE 1 MG/ML
0.25 INJECTION SUBCUTANEOUS
Status: DISCONTINUED | OUTPATIENT
Start: 2019-05-20 | End: 2019-05-21 | Stop reason: HOSPADM

## 2019-05-20 RX ORDER — NALBUPHINE HYDROCHLORIDE 10 MG/ML
10 INJECTION, SOLUTION INTRAMUSCULAR; INTRAVENOUS; SUBCUTANEOUS
Status: DISCONTINUED | OUTPATIENT
Start: 2019-05-20 | End: 2019-05-21 | Stop reason: HOSPADM

## 2019-05-20 RX ORDER — OXYTOCIN/RINGER'S LACTATE 20/1000 ML
999 PLASTIC BAG, INJECTION (ML) INTRAVENOUS ONCE
Status: ACTIVE | OUTPATIENT
Start: 2019-05-21 | End: 2019-05-21

## 2019-05-20 RX ORDER — METHYLERGONOVINE MALEATE 0.2 MG/ML
0.2 INJECTION INTRAVENOUS AS NEEDED
Status: DISCONTINUED | OUTPATIENT
Start: 2019-05-20 | End: 2019-05-21 | Stop reason: HOSPADM

## 2019-05-20 RX ORDER — HYDROMORPHONE HYDROCHLORIDE 1 MG/ML
1 INJECTION, SOLUTION INTRAMUSCULAR; INTRAVENOUS; SUBCUTANEOUS
Status: DISCONTINUED | OUTPATIENT
Start: 2019-05-20 | End: 2019-05-21 | Stop reason: HOSPADM

## 2019-05-21 ENCOUNTER — ANESTHESIA (OUTPATIENT)
Dept: LABOR AND DELIVERY | Age: 34
DRG: 560 | End: 2019-05-21
Payer: MEDICAID

## 2019-05-21 ENCOUNTER — ANESTHESIA EVENT (OUTPATIENT)
Dept: LABOR AND DELIVERY | Age: 34
DRG: 560 | End: 2019-05-21
Payer: MEDICAID

## 2019-05-21 LAB
ABO + RH BLD: NORMAL
BASOPHILS # BLD: 0.1 K/UL (ref 0–0.1)
BASOPHILS NFR BLD: 0 % (ref 0–2)
BLOOD GROUP ANTIBODIES SERPL: NORMAL
DIFFERENTIAL METHOD BLD: ABNORMAL
EOSINOPHIL # BLD: 0.2 K/UL (ref 0–0.4)
EOSINOPHIL NFR BLD: 1 % (ref 0–5)
ERYTHROCYTE [DISTWIDTH] IN BLOOD BY AUTOMATED COUNT: 14.8 % (ref 11.6–14.5)
HCT VFR BLD AUTO: 38.1 % (ref 35–45)
HGB BLD-MCNC: 12.3 G/DL (ref 12–16)
LYMPHOCYTES # BLD: 3.1 K/UL (ref 0.9–3.6)
LYMPHOCYTES NFR BLD: 19 % (ref 21–52)
MCH RBC QN AUTO: 25.5 PG (ref 24–34)
MCHC RBC AUTO-ENTMCNC: 32.3 G/DL (ref 31–37)
MCV RBC AUTO: 78.9 FL (ref 74–97)
MONOCYTES # BLD: 1.6 K/UL (ref 0.05–1.2)
MONOCYTES NFR BLD: 10 % (ref 3–10)
NEUTS SEG # BLD: 11.2 K/UL (ref 1.8–8)
NEUTS SEG NFR BLD: 70 % (ref 40–73)
PLATELET # BLD AUTO: 348 K/UL (ref 135–420)
PMV BLD AUTO: 10.9 FL (ref 9.2–11.8)
RBC # BLD AUTO: 4.83 M/UL (ref 4.2–5.3)
SPECIMEN EXP DATE BLD: NORMAL
WBC # BLD AUTO: 16.2 K/UL (ref 4.6–13.2)

## 2019-05-21 PROCEDURE — 75410000003 HC RECOV DEL/VAG/CSECN EA 0.5 HR

## 2019-05-21 PROCEDURE — 74011000250 HC RX REV CODE- 250

## 2019-05-21 PROCEDURE — 76060000078 HC EPIDURAL ANESTHESIA

## 2019-05-21 PROCEDURE — 74011250636 HC RX REV CODE- 250/636: Performed by: ADVANCED PRACTICE MIDWIFE

## 2019-05-21 PROCEDURE — 3E0R3BZ INTRODUCTION OF ANESTHETIC AGENT INTO SPINAL CANAL, PERCUTANEOUS APPROACH: ICD-10-PCS | Performed by: ANESTHESIOLOGY

## 2019-05-21 PROCEDURE — 75410000002 HC LABOR FEE PER 1 HR

## 2019-05-21 PROCEDURE — 74011250636 HC RX REV CODE- 250/636

## 2019-05-21 PROCEDURE — 75410000000 HC DELIVERY VAGINAL/SINGLE

## 2019-05-21 PROCEDURE — 85025 COMPLETE CBC W/AUTO DIFF WBC: CPT

## 2019-05-21 PROCEDURE — 65270000029 HC RM PRIVATE

## 2019-05-21 PROCEDURE — 77030007879 HC KT SPN EPDRL TELE -B: Performed by: ANESTHESIOLOGY

## 2019-05-21 PROCEDURE — 00HU33Z INSERTION OF INFUSION DEVICE INTO SPINAL CANAL, PERCUTANEOUS APPROACH: ICD-10-PCS | Performed by: ANESTHESIOLOGY

## 2019-05-21 PROCEDURE — 74011250636 HC RX REV CODE- 250/636: Performed by: MIDWIFE

## 2019-05-21 PROCEDURE — 74011250637 HC RX REV CODE- 250/637: Performed by: ADVANCED PRACTICE MIDWIFE

## 2019-05-21 PROCEDURE — 0HQ9XZZ REPAIR PERINEUM SKIN, EXTERNAL APPROACH: ICD-10-PCS | Performed by: OBSTETRICS & GYNECOLOGY

## 2019-05-21 PROCEDURE — 74011250637 HC RX REV CODE- 250/637: Performed by: MIDWIFE

## 2019-05-21 PROCEDURE — 86900 BLOOD TYPING SEROLOGIC ABO: CPT

## 2019-05-21 RX ORDER — FENTANYL/ROPIVACAINE/NS/PF 2MCG/ML-.1
PLASTIC BAG, INJECTION (ML) EPIDURAL
Status: COMPLETED
Start: 2019-05-21 | End: 2019-05-21

## 2019-05-21 RX ORDER — FENTANYL CITRATE 50 UG/ML
100 INJECTION, SOLUTION INTRAMUSCULAR; INTRAVENOUS ONCE
Status: DISCONTINUED | OUTPATIENT
Start: 2019-05-21 | End: 2019-05-21

## 2019-05-21 RX ORDER — ACETAMINOPHEN 325 MG/1
650 TABLET ORAL
Status: DISCONTINUED | OUTPATIENT
Start: 2019-05-21 | End: 2019-05-23 | Stop reason: HOSPADM

## 2019-05-21 RX ORDER — DIPHENHYDRAMINE HYDROCHLORIDE 50 MG/ML
25 INJECTION, SOLUTION INTRAMUSCULAR; INTRAVENOUS
Status: DISCONTINUED | OUTPATIENT
Start: 2019-05-21 | End: 2019-05-21

## 2019-05-21 RX ORDER — LIDOCAINE HYDROCHLORIDE AND EPINEPHRINE 15; 5 MG/ML; UG/ML
INJECTION, SOLUTION EPIDURAL
Status: COMPLETED | OUTPATIENT
Start: 2019-05-21 | End: 2019-05-21

## 2019-05-21 RX ORDER — PHENYLEPHRINE HCL IN 0.9% NACL 1 MG/10 ML
80 SYRINGE (ML) INTRAVENOUS AS NEEDED
Status: DISCONTINUED | OUTPATIENT
Start: 2019-05-21 | End: 2019-05-21 | Stop reason: HOSPADM

## 2019-05-21 RX ORDER — FENTANYL/ROPIVACAINE/NS/PF 2MCG/ML-.1
1-15 PLASTIC BAG, INJECTION (ML) EPIDURAL
Status: DISCONTINUED | OUTPATIENT
Start: 2019-05-21 | End: 2019-05-21

## 2019-05-21 RX ORDER — PHENYLEPHRINE HCL IN 0.9% NACL 1 MG/10 ML
80 SYRINGE (ML) INTRAVENOUS AS NEEDED
Status: DISCONTINUED | OUTPATIENT
Start: 2019-05-21 | End: 2019-05-21

## 2019-05-21 RX ORDER — IBUPROFEN 400 MG/1
800 TABLET ORAL
Status: DISCONTINUED | OUTPATIENT
Start: 2019-05-21 | End: 2019-05-23 | Stop reason: HOSPADM

## 2019-05-21 RX ORDER — FENTANYL/ROPIVACAINE/NS/PF 2MCG/ML-.1
12 PLASTIC BAG, INJECTION (ML) EPIDURAL
Status: DISCONTINUED | OUTPATIENT
Start: 2019-05-21 | End: 2019-05-21 | Stop reason: HOSPADM

## 2019-05-21 RX ORDER — FENTANYL CITRATE 50 UG/ML
INJECTION, SOLUTION INTRAMUSCULAR; INTRAVENOUS AS NEEDED
Status: DISCONTINUED | OUTPATIENT
Start: 2019-05-21 | End: 2019-05-21 | Stop reason: HOSPADM

## 2019-05-21 RX ORDER — SODIUM CHLORIDE 0.9 % (FLUSH) 0.9 %
5-40 SYRINGE (ML) INJECTION AS NEEDED
Status: DISCONTINUED | OUTPATIENT
Start: 2019-05-21 | End: 2019-05-21 | Stop reason: HOSPADM

## 2019-05-21 RX ORDER — ZOLPIDEM TARTRATE 5 MG/1
5 TABLET ORAL
Status: DISCONTINUED | OUTPATIENT
Start: 2019-05-21 | End: 2019-05-23 | Stop reason: HOSPADM

## 2019-05-21 RX ORDER — FENTANYL CITRATE 50 UG/ML
INJECTION, SOLUTION INTRAMUSCULAR; INTRAVENOUS
Status: COMPLETED
Start: 2019-05-21 | End: 2019-05-21

## 2019-05-21 RX ORDER — FENTANYL CITRATE 50 UG/ML
100 INJECTION, SOLUTION INTRAMUSCULAR; INTRAVENOUS ONCE
Status: ACTIVE | OUTPATIENT
Start: 2019-05-21 | End: 2019-05-21

## 2019-05-21 RX ORDER — SODIUM CHLORIDE 0.9 % (FLUSH) 0.9 %
5-40 SYRINGE (ML) INJECTION EVERY 8 HOURS
Status: DISCONTINUED | OUTPATIENT
Start: 2019-05-21 | End: 2019-05-21 | Stop reason: HOSPADM

## 2019-05-21 RX ORDER — PROMETHAZINE HYDROCHLORIDE 25 MG/ML
25 INJECTION, SOLUTION INTRAMUSCULAR; INTRAVENOUS
Status: DISCONTINUED | OUTPATIENT
Start: 2019-05-21 | End: 2019-05-23 | Stop reason: HOSPADM

## 2019-05-21 RX ORDER — ONDANSETRON 2 MG/ML
4 INJECTION INTRAMUSCULAR; INTRAVENOUS
Status: DISCONTINUED | OUTPATIENT
Start: 2019-05-21 | End: 2019-05-23 | Stop reason: HOSPADM

## 2019-05-21 RX ORDER — DIPHENHYDRAMINE HYDROCHLORIDE 50 MG/ML
12.5 INJECTION, SOLUTION INTRAMUSCULAR; INTRAVENOUS
Status: DISCONTINUED | OUTPATIENT
Start: 2019-05-21 | End: 2019-05-21 | Stop reason: HOSPADM

## 2019-05-21 RX ORDER — NALBUPHINE HYDROCHLORIDE 10 MG/ML
5 INJECTION, SOLUTION INTRAMUSCULAR; INTRAVENOUS; SUBCUTANEOUS
Status: DISCONTINUED | OUTPATIENT
Start: 2019-05-21 | End: 2019-05-21

## 2019-05-21 RX ORDER — OXYTOCIN/0.9 % SODIUM CHLORIDE 30/500 ML
0-20 PLASTIC BAG, INJECTION (ML) INTRAVENOUS
Status: DISCONTINUED | OUTPATIENT
Start: 2019-05-21 | End: 2019-05-23 | Stop reason: HOSPADM

## 2019-05-21 RX ORDER — NALOXONE HYDROCHLORIDE 0.4 MG/ML
0.2 INJECTION, SOLUTION INTRAMUSCULAR; INTRAVENOUS; SUBCUTANEOUS AS NEEDED
Status: DISCONTINUED | OUTPATIENT
Start: 2019-05-21 | End: 2019-05-21

## 2019-05-21 RX ORDER — LIDOCAINE HYDROCHLORIDE 10 MG/ML
INJECTION INFILTRATION; PERINEURAL AS NEEDED
Status: DISCONTINUED | OUTPATIENT
Start: 2019-05-21 | End: 2019-05-21 | Stop reason: HOSPADM

## 2019-05-21 RX ORDER — OXYCODONE AND ACETAMINOPHEN 5; 325 MG/1; MG/1
2 TABLET ORAL
Status: DISCONTINUED | OUTPATIENT
Start: 2019-05-21 | End: 2019-05-23 | Stop reason: HOSPADM

## 2019-05-21 RX ORDER — ONDANSETRON 2 MG/ML
INJECTION INTRAMUSCULAR; INTRAVENOUS
Status: DISPENSED
Start: 2019-05-21 | End: 2019-05-21

## 2019-05-21 RX ORDER — AMOXICILLIN 250 MG
1 CAPSULE ORAL
Status: DISCONTINUED | OUTPATIENT
Start: 2019-05-21 | End: 2019-05-23 | Stop reason: HOSPADM

## 2019-05-21 RX ORDER — NALOXONE HYDROCHLORIDE 0.4 MG/ML
0.2 INJECTION, SOLUTION INTRAMUSCULAR; INTRAVENOUS; SUBCUTANEOUS AS NEEDED
Status: DISCONTINUED | OUTPATIENT
Start: 2019-05-21 | End: 2019-05-21 | Stop reason: HOSPADM

## 2019-05-21 RX ADMIN — SODIUM CHLORIDE, SODIUM LACTATE, POTASSIUM CHLORIDE, AND CALCIUM CHLORIDE 125 ML/HR: 600; 310; 30; 20 INJECTION, SOLUTION INTRAVENOUS at 04:04

## 2019-05-21 RX ADMIN — FENTANYL CITRATE 100 MCG: 50 INJECTION, SOLUTION INTRAMUSCULAR; INTRAVENOUS at 09:37

## 2019-05-21 RX ADMIN — SODIUM CHLORIDE, SODIUM LACTATE, POTASSIUM CHLORIDE, AND CALCIUM CHLORIDE 125 ML/HR: 600; 310; 30; 20 INJECTION, SOLUTION INTRAVENOUS at 09:33

## 2019-05-21 RX ADMIN — Medication 125 ML/HR: at 16:17

## 2019-05-21 RX ADMIN — IBUPROFEN 800 MG: 400 TABLET, FILM COATED ORAL at 18:14

## 2019-05-21 RX ADMIN — BUTORPHANOL TARTRATE 2 MG: 2 INJECTION, SOLUTION INTRAMUSCULAR; INTRAVENOUS at 04:02

## 2019-05-21 RX ADMIN — Medication 12 ML/HR: at 09:38

## 2019-05-21 RX ADMIN — ACETAMINOPHEN 650 MG: 325 TABLET ORAL at 23:59

## 2019-05-21 RX ADMIN — ONDANSETRON 4 MG: 2 INJECTION INTRAMUSCULAR; INTRAVENOUS at 07:03

## 2019-05-21 RX ADMIN — LIDOCAINE HYDROCHLORIDE AND EPINEPHRINE 3 ML: 15; 5 INJECTION, SOLUTION EPIDURAL at 09:36

## 2019-05-21 RX ADMIN — MINERAL 30 ML: 999 OIL ORAL at 15:22

## 2019-05-21 RX ADMIN — BUTORPHANOL TARTRATE 2 MG: 2 INJECTION, SOLUTION INTRAMUSCULAR; INTRAVENOUS at 07:09

## 2019-05-21 RX ADMIN — SODIUM CHLORIDE, SODIUM LACTATE, POTASSIUM CHLORIDE, AND CALCIUM CHLORIDE 500 ML: 600; 310; 30; 20 INJECTION, SOLUTION INTRAVENOUS at 00:10

## 2019-05-21 RX ADMIN — Medication 2 MILLI-UNITS/MIN: at 12:48

## 2019-05-21 RX ADMIN — ROPIVACAINE HYDROCHLORIDE 12 ML/HR: 10 INJECTION, SOLUTION EPIDURAL at 09:38

## 2019-05-21 RX ADMIN — LIDOCAINE HYDROCHLORIDE 5 ML: 10 INJECTION INFILTRATION; PERINEURAL at 09:34

## 2019-05-21 NOTE — L&D DELIVERY NOTE
Delivery Note    Obstetrician:  Lenora Reynoso CNM    Assistant: none    Pre-Delivery Diagnosis: Term pregnancy, Spontaneous labor and Single fetus    Post-Delivery Diagnosis: Living  infant(s) and Male    Intrapartum Event: None    Procedure: Spontaneous vaginal delivery    Epidural: YES    Monitor:  Fetal Heart Tones - Internal and Uterine Contractions - Internal    Indications for instrumental delivery: none    Estimated Blood Loss:     Episiotomy: n/a    Laceration(s):  1st degree and vaginal    Laceration(s) repair: YES    Presentation: Cephalic    Fetal Description: de león    Fetal Position: Occiput posterior    Birth Weight: not yet assessed    Birth Length: not yet assessed    Apgar - One Minute: 8    Apgar - Five Minutes: 9    Umbilical Cord: 3 vessels present and Cord blood sent to lab for type, Rh, and Stevie' test  Specimens: placenta delivered intact  Complications:  none           Cord Blood Results:   Information for the patient's :  Kina Tyson [[id number]]   No results found for: PCTABR, PCTDIG, BILI, ABORH    Prenatal Labs:     Lab Results   Component Value Date/Time    ABO/Rh(D) B POSITIVE 2019 12:10 AM    HBsAg, External neg 10/15/2018    HIV, External neg 10/15/2018    Rubella, External non immune 10/15/2018    RPR, External neg 10/15/2018    Gonorrhea, External neg 10/15/2018    Chlamydia, External neg 10/15/2018    GrBStrep, External neg 2019        Attending Attestation: I was present and scrubbed for the entire procedure

## 2019-05-21 NOTE — PROGRESS NOTES
Pt assisted up to the bathroom and back to bed without difficulty. Pt tolerated well and able to void. Jolie-care provided. Chux and jolie-pads replaced. Pt denies further needs at this time.

## 2019-05-21 NOTE — PROGRESS NOTES
Labor Progress Note  Patient seen, fetal heart rate and contraction pattern evaluated, patient examined. Patient Vitals for the past 1 hrs:   Temp   05/21/19 1123 97.9 °F (36.6 °C)       Physical Exam:  Cervical Exam:  7 cm dilated    100% effaced    +1 station    Presenting Part: cephalic  Cervical Position: anterior  Membranes:  leaking clear fluid  Uterine Activity: difficult to assess IUPC placed  Fetal Heart Rate: Baseline: 140 per minute  Variability: moderate  Accelerations: no  Decelerations: none    Assessment/Plan:  Reassuring fetal status, Labor  Not progressing normally, IUPC/FSE placed.  Will start pitocin augmentation as needed , Continue plan for vaginal delivery

## 2019-05-21 NOTE — PROGRESS NOTES
Problem: Patient Education: Go to Patient Education Activity  Goal: Patient/Family Education  Outcome: Progressing Towards Goal     Problem: Vaginal Delivery: Day of Deliver-Laboring  Goal: Activity/Safety  Outcome: Progressing Towards Goal     Problem: Pain  Goal: *Control of Pain  Outcome: Progressing Towards Goal

## 2019-05-21 NOTE — PROGRESS NOTES
1909 Bedside shift change report given to Taylor Dejesus and Milton Berger (oncoming nurse) by Sudha Lam RN (offgoing nurse). Report included the following information SBAR, MAR and Recent Results. Pt is A&O x4, speech is clear. Pt complains of pain 0/10 between contractions. Pt states she plans to go natural and will inform us if she changes her mind. EFM and TOCO adjusted. Family at bedside. Will continue to monitor and assess. 5947 Pt requests epidural and anesthesia is paged     8566 Bedside shift change report given to Cherise Blankenship RN (oncoming nurse) by Milton Berger RN  (offgoing nurse). Report included the following information SBAR, MAR and Recent Results.

## 2019-05-21 NOTE — PROGRESS NOTES
9205 Dr. Fantasma Barnes at bedside explaining epidural procedure. 1817 Time out completed. 6589 Procedure begins  0935 epidural needle removed, epidural catheter is in place  0936 Test dose administered  0937 Fentanyl administered by Dr. Fantasma Barnes  2762 Procedure is complete, catheter being taped in place by Dr. Fantasma Barnes.

## 2019-05-21 NOTE — PROGRESS NOTES
Brianna Greene CNM at bedside for SVE and placement if IUPC and FSE. Pt tolerated well. Pt assisted to right lateral recumbent position with peanut ball between knees.

## 2019-05-21 NOTE — PROGRESS NOTES
Problem: Patient Education: Go to Patient Education Activity  Goal: Patient/Family Education  Outcome: Progressing Towards Goal     Problem: Vaginal Delivery: Day of Delivery-Post delivery  Goal: Off Pathway (Use only if patient is Off Pathway)  Outcome: Progressing Towards Goal  Goal: Activity/Safety  Outcome: Progressing Towards Goal  Goal: Consults, if ordered  Outcome: Progressing Towards Goal  Goal: Nutrition/Diet  Outcome: Progressing Towards Goal  Goal: Discharge Planning  Outcome: Progressing Towards Goal  Goal: Medications  Outcome: Progressing Towards Goal  Goal: Treatments/Interventions/Procedures  Outcome: Progressing Towards Goal  Goal: *Vital signs within defined limits  Outcome: Progressing Towards Goal  Goal: *Labs within defined limits  Outcome: Progressing Towards Goal  Goal: *Hemodynamically stable  Outcome: Progressing Towards Goal  Goal: *Optimal pain control at patient's stated goal  Outcome: Progressing Towards Goal  Goal: *Participates in infant care  Outcome: Progressing Towards Goal  Goal: *Demonstrates progressive activity  Outcome: Progressing Towards Goal  Goal: *Tolerating diet  Outcome: Progressing Towards Goal     Problem: Vaginal Delivery: Postpartum Day 1  Goal: Off Pathway (Use only if patient is Off Pathway)  Outcome: Progressing Towards Goal  Goal: Activity/Safety  Outcome: Progressing Towards Goal  Goal: Consults, if ordered  Outcome: Progressing Towards Goal  Goal: Diagnostic Test/Procedures  Outcome: Progressing Towards Goal  Goal: Nutrition/Diet  Outcome: Progressing Towards Goal  Goal: Discharge Planning  Outcome: Progressing Towards Goal  Goal: Medications  Outcome: Progressing Towards Goal  Goal: Treatments/Interventions/Procedures  Outcome: Progressing Towards Goal  Goal: Psychosocial  Outcome: Progressing Towards Goal  Goal: *Vital signs within defined limits  Outcome: Progressing Towards Goal  Goal: *Labs within defined limits  Outcome: Progressing Towards Goal  Goal: *Hemodynamically stable  Outcome: Progressing Towards Goal  Goal: *Optimal pain control at patient's stated goal  Outcome: Progressing Towards Goal  Goal: *Participates in infant care  Outcome: Progressing Towards Goal  Goal: *Demonstrates progressive activity  Outcome: Progressing Towards Goal  Goal: *Performs self perineal care  Outcome: Progressing Towards Goal  Goal: *Appropriate parent-infant bonding  Outcome: Progressing Towards Goal  Goal: *Tolerating diet  Outcome: Progressing Towards Goal  Goal: *Performs self breast care  Outcome: Progressing Towards Goal     Problem: Vaginal Delivery: Postpartum 2  Goal: Off Pathway (Use only if patient is Off Pathway)  Outcome: Progressing Towards Goal  Goal: Activity/Safety  Outcome: Progressing Towards Goal  Goal: Consults, if ordered  Outcome: Progressing Towards Goal  Goal: Nutrition/Diet  Outcome: Progressing Towards Goal  Goal: Discharge Planning  Outcome: Progressing Towards Goal  Goal: Medications  Outcome: Progressing Towards Goal  Goal: Treatments/Interventions/Procedures  Outcome: Progressing Towards Goal  Goal: Psychosocial  Outcome: Progressing Towards Goal     Problem: Vaginal Delivery: Discharge Outcomes  Goal: *Verbalizes name, dosage, time, side effects, and number of days to continue medications  Outcome: Progressing Towards Goal  Goal: *Describes available resources and support systems  Outcome: Progressing Towards Goal  Goal: *No signs and symptoms of infection  Outcome: Progressing Towards Goal  Goal: *Birth certificate information completed  Outcome: Progressing Towards Goal  Goal: *Received and verbalizes understanding of discharge plan and instructions  Outcome: Progressing Towards Goal  Goal: *Vital signs within defined limits  Outcome: Progressing Towards Goal  Goal: *Labs within defined limits  Outcome: Progressing Towards Goal  Goal: *Hemodynamically stable  Outcome: Progressing Towards Goal  Goal: *Optimal pain control at patient's stated goal  Outcome: Progressing Towards Goal  Goal: *Participates in infant care  Outcome: Progressing Towards Goal  Goal: *Demonstrates progressive activity  Outcome: Progressing Towards Goal  Goal: *Appropriate parent-infant bonding  Outcome: Progressing Towards Goal  Goal: *Tolerating diet  Outcome: Progressing Towards Goal     Problem: Pain  Goal: *Control of Pain  Outcome: Progressing Towards Goal

## 2019-05-21 NOTE — PROGRESS NOTES
0720 Bedside and Verbal shift change report given to Roger Lakhani RN and Lana Jerome RN (oncoming nurse) by Namita Hahn RN (offgoing nurse). Report included the following information SBAR, Kardex, MAR and Recent Results.

## 2019-05-21 NOTE — LACTATION NOTE
Mom educated on breastfeeding basics--hunger cues, feeding on demand, waking baby if baby sleeps too long between feeds, importance of skin to skin, positioning and latching, risk of pacifier use and supplemental feedings, and importance of rooming in--and use of log sheet. Mom also educated on benefits of breastfeeding for herself and baby. Mom verbalized understanding. Formula provided to mom and educated on infant's stomach size, WNL volume intake in , how to safely prepare formula, bottle hygiene, appropriate way to feed infant with bottle, and burping techniques. Handout given for reinforcement. Mom verbalized understanding and no questions at this time. 18 assisted with latching . No questions at this time.

## 2019-05-21 NOTE — PROGRESS NOTES
Bedside shift change report given to JENNIFER Peralta RN (oncoming nurse) by Estefany Barnes (offgoing nurse). Report included the following information SBAR, Intake/Output and MAR. Care of stable pt relieved at this time.

## 2019-05-21 NOTE — ANESTHESIA PREPROCEDURE EVALUATION
Relevant Problems   No relevant active problems       Anesthetic History   No history of anesthetic complications            Review of Systems / Medical History  Patient summary reviewed, nursing notes reviewed and pertinent labs reviewed    Pulmonary            Asthma : well controlled  Pertinent negatives: No smokerPneumonia: rare albuterol use at baseline. using more frequently during pregnancy. Neuro/Psych         Psychiatric history (h/o depression)  Pertinent negatives: No seizures and CVA   Cardiovascular                Pertinent negatives: No hypertension  Exercise tolerance: >4 METS     GI/Hepatic/Renal             Pertinent negatives: No liver disease and renal disease   Endo/Other          Pertinent negatives: No diabetes   Other Findings              Physical Exam    Airway  Mallampati: II  TM Distance: 4 - 6 cm  Neck ROM: normal range of motion   Mouth opening: Normal     Cardiovascular    Rhythm: regular  Rate: normal         Dental  No notable dental hx       Pulmonary  Breath sounds clear to auscultation               Abdominal  GI exam deferred       Other Findings            Anesthetic Plan    ASA: 2  Anesthesia type: epidural            Anesthetic plan and risks discussed with: Patient      Plan labor epidural.  All risks discussed including but not limited to pain, bleeding, infection, block failure, headache, hypotension, and nerve damage. Patient understands and accepts all risks and agrees with plan to proceed with epidural analgesia.

## 2019-05-21 NOTE — PROGRESS NOTES
Pt instructed to call out to nurse once she is finished nursing for jolie-care and assistance up to the bathroom.

## 2019-05-21 NOTE — PROGRESS NOTES
Problem: Vaginal Delivery: Day of Deliver-Laboring  Goal: Off Pathway (Use only if patient is Off Pathway)  Outcome: Progressing Towards Goal  Goal: Activity/Safety  Outcome: Progressing Towards Goal  Goal: Consults, if ordered  Outcome: Progressing Towards Goal  Goal: Diagnostic Test/Procedures  Outcome: Progressing Towards Goal  Goal: Nutrition/Diet  Outcome: Progressing Towards Goal  Goal: Discharge Planning  Outcome: Progressing Towards Goal  Goal: Medications  Outcome: Progressing Towards Goal  Goal: Respiratory  Outcome: Progressing Towards Goal  Goal: Treatments/Interventions/Procedures  Outcome: Progressing Towards Goal  Goal: *Vital signs within defined limits  Outcome: Progressing Towards Goal  Goal: *Labs within defined limits  Outcome: Progressing Towards Goal  Goal: *Hemodynamically stable  Outcome: Progressing Towards Goal  Goal: *Optimal pain control at patient's stated goal  Outcome: Progressing Towards Goal     Problem: Pain  Goal: *Control of Pain  Outcome: Progressing Towards Goal

## 2019-05-21 NOTE — PROGRESS NOTES
Bedside shift change report given to JUSTICE De Jesus (oncoming nurse) by Sigrid Agosto RN (offgoing nurse). Report included the following information SBAR, Intake/Output and MAR. Care of pt assumed at this time. Pt to left lateral recumbent position.

## 2019-05-21 NOTE — ANESTHESIA PROCEDURE NOTES
Epidural Block    Start time: 5/21/2019 9:20 AM  End time: 5/21/2019 9:43 AM  Performed by: Eddie Darnell MD  Authorized by: Eddie Darnell MD     Pre-Procedure  Indication: labor epidural    Preanesthetic Checklist: patient identified, risks and benefits discussed, anesthesia consent, site marked, patient being monitored, timeout performed and anesthesia consent    Timeout Time: 09:28        Epidural:   Patient position:  Seated  Prep region:  Lumbar  Prep: Chlorhexidine    Location:  L3-4    Needle and Epidural Catheter:   Needle Type:  Tuohy  Needle Gauge:  17 G  Injection Technique:  Loss of resistance using saline  Attempts:  1  Catheter Size:  19 G  Catheter at Skin Depth (cm):  12  Depth in Epidural Space (cm):  5  Events: no blood with aspiration, no cerebrospinal fluid with aspiration, no paresthesia and negative aspiration test    Test Dose:  Negative    Assessment:   Catheter Secured:  Tegaderm and tape  Insertion:  Uncomplicated  Patient tolerance:  Patient tolerated the procedure well with no immediate complications

## 2019-05-21 NOTE — PROGRESS NOTES
Labor Progress Note  Patient seen, fetal heart rate and contraction pattern evaluated, patient examined. No data found.     Physical Exam:  Cervical Exam:  6 cm dilated    90% effaced    0 station    Presenting Part: cephalic  Cervical Position: mid position  Consistency: Medium  Membranes:  leaking clear fluid  Uterine Activity: Frequency: Every 2 minutes, Duration: 60 seconds and Intensity: moderate  Fetal Heart Rate: Baseline: 135 per minute  Variability: moderate  Accelerations: yes  Decelerations: none  Uterine contractions: regular, every 2-3 minutes    Assessment/Plan:  Reassuring fetal status, Labor  Progressing normally, Continue plan for vaginal delivery

## 2019-05-21 NOTE — H&P
History & Physical    Name: Steph Ivey MRN: [id number]  SSN: [id number]    YOB: 1985  Age: 34 y.o. Sex: female        Subjective: ctx since 1700, denies VB or LOF     Estimated Date of Delivery: 19  OB History        3    Para        Term                AB   1    Living           SAB   1    TAB        Ectopic        Molar        Multiple        Live Births                      Ms. Mahogany Chester , a 35 yo, , is admitted with pregnancy at 39w6d for early labor with painful ctx. Prenatal course was complicated by asthma, smoker, ureaplasma treated, obesity. Please see prenatal records for details. No Known Allergies    Objective:     Vitals:  Vitals:    19 2333   Weight: 106.1 kg (234 lb)   Height: 5' 9.5\" (1.765 m)        Physical Exam:  Patient without distress. Heart: Regular rate and rhythm  Lung: clear to auscultation throughout lung fields, no wheezes, no rales, no rhonchi and normal respiratory effort  Abdomen: soft, nontender  Fundus: soft and non tender  Perineum: blood absent, amniotic fluid absent  Cervical Exam: 4 cm dilated    90% effaced    -1 station    Presenting Part: cephalic  Cervical Position: mid position  Consistency: Soft  Lower Extremities:  - Edema No   - No evidence of DVT seen on physical exam.   - Patellar Reflexes: 1+ bilaterally   - Clonus: absent    Membranes:  Intact  Fetal Heart Rate & Contraction pattern: Reactive  Baseline: 135 per minute  Variability: moderate  Accelerations: yes  Decelerations: none  Uterine contractions: regular, every 3-4 minutes    Prenatal Labs:   No results found for: RUBELLAEXT, GRBSEXT, HBSAGEXT, HIVEXT, RPREXT, GONNOEXT, CHLAMEXT      Assessment/Plan: early labor     Plan: Admit for Reassuring fetal status, Labor  Progressing normally  Continue expectant management, Continue plan for vaginal delivery. Group B Strep was negative.  Consider AROM for labor augmentation, pain control unsure at this time    Signed By:  Fatmata Rosas CNM     May 21, 2019

## 2019-05-21 NOTE — PROGRESS NOTES
2318 Received to L & D unit with c/o ctx every 4-6 minutes. Assisted to triage #3. .     2352 MARILOU Joe Jose is at desk and informed of arrival, , 39+5 weeks, SVE /-1. Orders for admission received. 235 Transferred to BR#3. Maryan Dinh RN assumed care at this time.

## 2019-05-21 NOTE — PROGRESS NOTES
Ante Partum Progress Note    Selina Spikes  39w6d    Assessment: 39w6d   Labor, AROM for augmentation  Requesting stadol for pain management    Plan:  Proceed with delivery today. Augmentation with pitocin if indicated - not at this time. Stadol for pain, anesthesia consult if patient requests, recheck in 4 hours or prn indication. Orders/Charges: AROM    Patient states she has no new complaints    Vitals:  Visit Vitals  /72   Pulse 80   Temp 98 °F (36.7 °C)   Resp 18   Ht 5' 9.5\" (1.765 m)   Wt 106.1 kg (234 lb)   Breastfeeding? Yes   BMI 34.06 kg/m²     Temp (24hrs), Av °F (36.7 °C), Min:98 °F (36.7 °C), Max:98 °F (36.7 °C)      Last 24hr Input/Output:  No intake or output data in the 24 hours ending 19 0422     Non stress test:  N/a    Cat I fetal tracing - 135 baseline rate, moderate variability, acclerations, no declerations    Uterine Activity: Moderate, Regular, Duration: 60 seconds and Intensity: moderate     Exam:  Patient without distress. Abdomen, fundus soft non-tender     Extremities, no redness or tenderness             CERVIX; Cervical Exam  Dilation (cm): 5  Eff: 100 %  Station: -1  Position: Mid  Cervical Consistency: Soft  Vaginal exam done by? : MARILOU Paz CNM  Membrane Status: AROM    Additional Exam: AROM clear fluid    Labs:     Lab Results   Component Value Date/Time    WBC 16.2 (H) 2019 12:10 AM    WBC 15.6 (H) 2017 03:15 AM    WBC 15.1 (H) 2017 05:50 PM    HGB 12.3 2019 12:10 AM    HGB 12.0 2017 03:15 AM    HGB 12.8 2017 05:50 PM    HCT 38.1 2019 12:10 AM    HCT 35.4 2017 03:15 AM    HCT 37.4 2017 05:50 PM    PLATELET 519  12:10 AM    PLATELET 755  03:15 AM    PLATELET 901  05:50 PM       Recent Results (from the past 24 hour(s))   CBC WITH AUTOMATED DIFF    Collection Time: 19 12:10 AM   Result Value Ref Range    WBC 16.2 (H) 4.6 - 13.2 K/uL    RBC 4.83 4.20 - 5.30 M/uL    HGB 12.3 12.0 - 16.0 g/dL    HCT 38.1 35.0 - 45.0 %    MCV 78.9 74.0 - 97.0 FL    MCH 25.5 24.0 - 34.0 PG    MCHC 32.3 31.0 - 37.0 g/dL    RDW 14.8 (H) 11.6 - 14.5 %    PLATELET 075 509 - 165 K/uL    MPV 10.9 9.2 - 11.8 FL    NEUTROPHILS 70 40 - 73 %    LYMPHOCYTES 19 (L) 21 - 52 %    MONOCYTES 10 3 - 10 %    EOSINOPHILS 1 0 - 5 %    BASOPHILS 0 0 - 2 %    ABS. NEUTROPHILS 11.2 (H) 1.8 - 8.0 K/UL    ABS. LYMPHOCYTES 3.1 0.9 - 3.6 K/UL    ABS. MONOCYTES 1.6 (H) 0.05 - 1.2 K/UL    ABS. EOSINOPHILS 0.2 0.0 - 0.4 K/UL    ABS.  BASOPHILS 0.1 0.0 - 0.1 K/UL    DF AUTOMATED     TYPE & SCREEN    Collection Time: 05/21/19 12:10 AM   Result Value Ref Range    Crossmatch Expiration 05/24/2019     ABO/Rh(D) B POSITIVE     Antibody screen NEG

## 2019-05-21 NOTE — PROGRESS NOTES
2355 - Patient transferred to [facility]. EFM and TOCO reapplied. Report received from [facility] - IV started, labs drawn  0030 - Assessment complete. POC discussed. Questions and concerns addressed. 0032 - MARILOU Ruizck at bedside for assessment. 0100 - patient positioned left lateral, EFM and TOCO readjusted  0330 - patient positioned left lateral with EFM and ESTELLA repositioned  0356 - MARILOU Zapata at bedside, SVE 5/100/-1.  0400 - AROM clear fluid  0500 - patient positioned left lateral with peanut ball, EFM readjusted  0525 - SVE 5-6/100/-1, patient up to bathroom  0535 - patient positioned right lateral with peanut ball  0620 - SVE 6/100/-1, positioned sitting up  0627 - Patient up to bathroom  5291 - Patient back in bed, EFM and TOCO reapplied. Positioned sitting up.  0485 - Patient requesting pain meds and reports nausea. SVE by MONICA White 6/100/0.  8294 - Zofran and Stadol given. 0720 - Bedside and Verbal shift change report given to Yolanda Hernandez RN/GEORGETTE Mayo RN (oncoming nurse) by Dina Swanson RN (offgoing nurse). Report included the following information SBAR, Kardex, Intake/Output, MAR and Recent Results.

## 2019-05-22 LAB
HCT VFR BLD AUTO: 34.9 % (ref 35–45)
HGB BLD-MCNC: 11.1 G/DL (ref 12–16)

## 2019-05-22 PROCEDURE — 36415 COLL VENOUS BLD VENIPUNCTURE: CPT

## 2019-05-22 PROCEDURE — 85018 HEMOGLOBIN: CPT

## 2019-05-22 PROCEDURE — 74011250637 HC RX REV CODE- 250/637: Performed by: ADVANCED PRACTICE MIDWIFE

## 2019-05-22 PROCEDURE — 85014 HEMATOCRIT: CPT

## 2019-05-22 PROCEDURE — 65270000029 HC RM PRIVATE

## 2019-05-22 RX ADMIN — ACETAMINOPHEN 650 MG: 325 TABLET ORAL at 12:41

## 2019-05-22 RX ADMIN — ACETAMINOPHEN 650 MG: 325 TABLET ORAL at 04:56

## 2019-05-22 RX ADMIN — IBUPROFEN 800 MG: 400 TABLET, FILM COATED ORAL at 21:36

## 2019-05-22 NOTE — ANESTHESIA POSTPROCEDURE EVALUATION
5/22/2019  3:51 PM    Laboring Epidural Follow-up Note       Referring physician: Baltazar Rendon,*   Patient status post vaginal delivery with labor epidural.      Visit Vitals  /58 (BP 1 Location: Left arm, BP Patient Position: At rest)   Pulse 74   Temp 36.3 °C (97.4 °F)   Resp 16   Ht 5' 9.5\" (1.765 m)   Wt 106.1 kg (234 lb)   SpO2 98%   Breastfeeding? Yes   BMI 34.06 kg/m²       Patient ambulating and voiding without difficulty. Patient denies headache. Patient denies backache.     Mekhi Tilley CRNA

## 2019-05-22 NOTE — LACTATION NOTE
Per mom, has decided she no longer wants to breastfeed. Discussed ways to dry up milk supply. Mom verbalized understanding and no questions at this time.

## 2019-05-22 NOTE — PROGRESS NOTES
2230 transferred to [facility] via w/c. Oriented to room and call bell. Denies any needs. 2310 VSS. Assessment complete. States she wants to bottle feed baby. 0710 Bedside and Verbal shift change report given to JENNA Patel RN (oncoming nurse) by MONICA Frausto RNC  (offgoing nurse). Report included the following information SBAR, Procedure Summary and MAR.

## 2019-05-22 NOTE — PROGRESS NOTES
1920 Verbal shift change report given to Issa Liu RN (oncoming nurse) by Catarina Leavitt RN (offgoing nurse). Report included the following information SBAR, Kardex, Intake/Output, MAR and Recent Results. 2020 Patient ambulated to restroom with Milvia De La Fuente RN.    2046 Patient resting quietly. 2224 TRANSFER - OUT REPORT:    Verbal report given to MONICA Berger RN (name) on [facility]  being transferred to Mother/baby (unit) for routine progression of care       Report consisted of patients Situation, Background, Assessment and   Recommendations(SBAR). Information from the following report(s) SBAR, Kardex, Intake/Output, MAR and Recent Results was reviewed with the receiving nurse. Lines:   Peripheral IV 05/21/19 Left Hand (Active)   Site Assessment Clean, dry, & intact 5/21/2019 12:30 AM   Phlebitis Assessment 0 5/21/2019 12:30 AM   Infiltration Assessment 0 5/21/2019 12:30 AM   Dressing Type Tape;Transparent 5/21/2019 12:30 AM   Hub Color/Line Status Pink 5/21/2019 12:30 AM        Opportunity for questions and clarification was provided.       Patient transported with:   Registered Nurse

## 2019-05-22 NOTE — PROGRESS NOTES
Bedside shift change report given to KAROL Antonio RN (oncoming nurse) by Rendall Curling. JUANPABLO Patel (offgoing nurse). Report included the following information SBAR, Kardex, Intake/Output, MAR and Recent Results.

## 2019-05-22 NOTE — ADT AUTH CERT NOTES
Patient Demographics Patient Name [id number], [address] [facility] 
[id number] Sex Female  
1985 Address [place] Phone [phone] (Mobile) *Preferred*  
CSN:  
[id number] Admit Date: Admit Time Room Bed May 20, 2019 11:20  [[id number]] 01 [[id number]] Attending Providers Provider Pager From To Teddy Giles MD  19 Birth History Birth Information Birth Length: 49.5 cm Birth Weight: 2.82 kg Birth Head Circ: 30.5 cm Gestational Age: 41 11/7 weeks Delivery Method: Vaginal, Spontaneous Duration of Labor: 1st: 10h 54m / 2nd: 1h 17m APGARs 1 Minute: 9  
5 Minute: 9

## 2019-05-22 NOTE — PROGRESS NOTES
Problem: Patient Education: Go to Patient Education Activity  Goal: Patient/Family Education  Outcome: Progressing Towards Goal     Problem: Vaginal Delivery: Day of Delivery-Post delivery  Goal: Activity/Safety  Outcome: Progressing Towards Goal  Goal: Nutrition/Diet  Outcome: Progressing Towards Goal  Goal: Medications  Outcome: Progressing Towards Goal  Goal: *Vital signs within defined limits  Outcome: Progressing Towards Goal  Goal: *Labs within defined limits  Outcome: Progressing Towards Goal  Goal: *Participates in infant care  Outcome: Progressing Towards Goal  Goal: *Tolerating diet  Outcome: Progressing Towards Goal     Problem: Vaginal Delivery: Postpartum Day 1  Goal: Activity/Safety  Outcome: Progressing Towards Goal     Problem: Pain  Goal: *Control of Pain  Outcome: Progressing Towards Goal

## 2019-05-22 NOTE — PROGRESS NOTES
Progress Note    Patient: Sushil Dawson MRN: [id number]     YOB: 1985  Age: 34 y.o. Subjective:     Postpartum Day: 1    The patient is feeling well. Pain is  well controlled with current medications. Urinary output is adequate. Baby is feeding via both breast and bottle  without difficulty. Objective:      Patient Vitals for the past 12 hrs:   Temp Pulse Resp BP SpO2   05/21/19 2310 98 °F (36.7 °C) 73 18 112/49 99 %       General:    alert, cooperative, no distress   Lochia:  appropriate   Uterine Fundus:   firm @ umbilicus    Perineum:  well-approximated   DVT Evaluation:  No evidence of DVT seen on physical exam.  Negative Petra's sign. Lab/Data Review:  Recent Results (from the past 24 hour(s))   HEMOGLOBIN    Collection Time: 05/22/19  2:34 AM   Result Value Ref Range    HGB 11.1 (L) 12.0 - 16.0 g/dL   HEMATOCRIT    Collection Time: 05/22/19  2:34 AM   Result Value Ref Range    HCT 34.9 (L) 35.0 - 45.0 %     All lab results for the last 24 hours reviewed. Assessment:     Delivery: spontaneous vaginal delivery    Plan:     Doing well postpartum vaginal delivery. Continue current postpartum care. Encouraged hydration, nutrition and ambulation. Plan DC home tomorrow.     Signed By: Danielle Smith CNM     May 22, 2019

## 2019-05-22 NOTE — PROGRESS NOTES
Bedside and Verbal shift change report given to K. Sande Sandifer, RN (oncoming nurse) by Franck Patel RN (offgoing nurse). Report included the following information SBAR, Kardex, Intake/Output, MAR and Recent Results. 2130- Assessment completed. Informational sheet given on MMR vaccine as patient is non-immune. Updated on POC, tray removed and given juice per request.     0700- Bedside and Verbal shift change report given to JUAN Ellington RN (oncoming nurse) by Lucile Sero. Sande Sandifer, RN (offgoing nurse). Report included the following information SBAR, Kardex, Intake/Output, MAR and Recent Results. Pt would like the MMR vaccine.

## 2019-05-23 VITALS
TEMPERATURE: 97.8 F | HEART RATE: 82 BPM | HEIGHT: 70 IN | WEIGHT: 234 LBS | OXYGEN SATURATION: 98 % | SYSTOLIC BLOOD PRESSURE: 101 MMHG | RESPIRATION RATE: 16 BRPM | DIASTOLIC BLOOD PRESSURE: 56 MMHG | BODY MASS INDEX: 33.5 KG/M2

## 2019-05-23 PROCEDURE — 74011250637 HC RX REV CODE- 250/637: Performed by: ADVANCED PRACTICE MIDWIFE

## 2019-05-23 PROCEDURE — 90707 MMR VACCINE SC: CPT | Performed by: ADVANCED PRACTICE MIDWIFE

## 2019-05-23 PROCEDURE — 74011250636 HC RX REV CODE- 250/636: Performed by: ADVANCED PRACTICE MIDWIFE

## 2019-05-23 RX ADMIN — MEASLES, MUMPS, AND RUBELLA VIRUS VACCINE LIVE 0.5 ML: 1000; 12500; 1000 INJECTION, POWDER, LYOPHILIZED, FOR SUSPENSION SUBCUTANEOUS at 08:57

## 2019-05-23 RX ADMIN — IBUPROFEN 800 MG: 400 TABLET, FILM COATED ORAL at 08:56

## 2019-05-23 NOTE — PROGRESS NOTES
Discharge instructions given and reviewed including book given for self care and infant care. Discussed for self care diet, activity, bathing, bleeding, DVT, s/sx of infection, stroke f/u appt and when to notify MD or 911and meds. With infant care discussed feeding, bathing, positioning, s/sx of infection and circ care, and when to notify peds, jaundice and f/u appts. Fob also present and opportunity of questions given and answered with both parents verbal understanding.

## 2019-05-23 NOTE — DISCHARGE INSTRUCTIONS
POST DELIVERY DISCHARGE INSTRUCTIONS    Name: Raynell Dakin  YOB: 1985  Primary Diagnosis: Active Problems:    Term pregnancy (5/20/2019)        General:     Diet/Diet Restrictions:  Eight 8-ounce glasses of fluid daily (water, juices); avoid excessive caffeine intake. Meals/snacks as desired which are high in fiber and carbohydrates and low in fat and cholesterol. Physical Activity / Restrictions / Safety:     Avoid heavy lifting, no more than the baby alone (not the baby in the car seat). Avoid intercourse until you are seen at your postpartum visit. No douching or tampon use. Check with obstetrician before starting or resuming an exercise program.         Discharge Instructions/Special Treatment/Home Care Needs:     Continue prenatal vitamins. Continue to use squirt bottle with warm water on your perineum after each bathroom use until bleeding stops. Call your doctor for the following:     Fever over 101 degrees by mouth. Vaginal bleeding that soaks two pads per hour for more than one hour. Red streaks or increased swelling of legs, painful red streaks on your breast.  If you feel extremely anxious or overwhelmed. If you have thoughts of harming yourself and/or your baby. Pain Management:     Pain Management:   Take Acetaminophen (Tylenol) or Ibuprofen (Advil, Motrin), as directed for pain. Use a warm Sitz bath 3 times daily to relieve perineal or hemorrhoidal discomfort. For hemorrhoidal discomfort, use Tucks and Anusol cream as needed and directed.     Follow-Up Care:     Appointment with MD:   Follow-up Appointments   Procedures    FOLLOW UP VISIT Appointment in: 6 Weeks     Standing Status:   Standing     Number of Occurrences:   1     Order Specific Question:   Appointment in     Answer:   6 Weeks     Telephone number: [phone]      Signed By: IVELISSE Angelo Activation    Thank you for requesting access to [employer]. Please follow the instructions below to securely access and download your online medical record. [employer] allows you to send messages to your doctor, view your test results, renew your prescriptions, schedule appointments, and more. How Do I Sign Up? 1. In your internet browser, go to [email or web address]  2. Click on the First Time User? Click Here link in the Sign In box. You will be redirect to the New Member Sign Up page. 3. Enter your [employer] Access Code exactly as it appears below. You will not need to use this code after youve completed the sign-up process. If you do not sign up before the expiration date, you must request a new code. [employer] Access Code: [email or web address]  Expires: 2019  1:09 AM (This is the date your [employer] access code will )    4. Enter the last four digits of your Social Security Number (xxxx) and Date of Birth (mm/dd/yyyy) as indicated and click Submit. You will be taken to the next sign-up page. 5. Create a [employer] ID. This will be your [employer] login ID and cannot be changed, so think of one that is secure and easy to remember. 6. Create a [employer] password. You can change your password at any time. 7. Enter your Password Reset Question and Answer. This can be used at a later time if you forget your password. 8. Enter your e-mail address. You will receive e-mail notification when new information is available in [address]. 9. Click Sign Up. You can now view and download portions of your medical record. 10. Click the Download Summary menu link to download a portable copy of your medical information. Additional Information    If you have questions, please visit the Frequently Asked Questions section of the [employer] website at [email or web address] Remember, [employer] is NOT to be used for urgent needs. For medical emergencies, dial 911.     Patient armband removed and shredded

## 2019-05-23 NOTE — ROUTINE PROCESS
Bedside and Verbal shift change report given to JUAN Hinson RN (oncoming nurse) by Steve Dawn RN (offgoing nurse). Report included the following information SBAR, Kardex, Procedure Summary, Intake/Output, MAR, Recent Results and Med Rec Status.